# Patient Record
Sex: FEMALE | Race: WHITE
[De-identification: names, ages, dates, MRNs, and addresses within clinical notes are randomized per-mention and may not be internally consistent; named-entity substitution may affect disease eponyms.]

---

## 2017-03-14 ENCOUNTER — HOSPITAL ENCOUNTER (EMERGENCY)
Dept: HOSPITAL 56 - MW.ED | Age: 72
Discharge: HOME | End: 2017-03-14
Payer: MEDICARE

## 2017-03-14 VITALS — SYSTOLIC BLOOD PRESSURE: 141 MMHG | DIASTOLIC BLOOD PRESSURE: 79 MMHG

## 2017-03-14 DIAGNOSIS — Z88.5: ICD-10-CM

## 2017-03-14 DIAGNOSIS — Z90.710: ICD-10-CM

## 2017-03-14 DIAGNOSIS — Z90.49: ICD-10-CM
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
## 2017-03-14 DIAGNOSIS — Z88.0: ICD-10-CM

## 2017-03-14 DIAGNOSIS — Z87.891: ICD-10-CM

## 2017-03-14 DIAGNOSIS — I10: ICD-10-CM

## 2017-03-14 DIAGNOSIS — S80.01XA: Primary | ICD-10-CM

## 2017-03-14 DIAGNOSIS — Z91.012: ICD-10-CM

## 2017-03-14 DIAGNOSIS — Z79.899: ICD-10-CM

## 2017-03-14 NOTE — EDM.PDOC
ED HPI Trauma





- General


Chief Complaint: Lower Extremity Injury/Pain


Stated Complaint: RIGHT KNEE


Time Seen by Provider: 03/14/17 10:39


Source: Reports: Patient


History Limitations: Reports: No limitations





- History of Present Illness


INITIAL COMMENTS - FREE TEXT/NARRATIVE: 


History of present illness:


[] Patient slipped on this no yesterday and landed on her right knee with pain, 

swelling and bruising. She woke up this morning with worsening pain and is 

difficult for her to walk. Her right knee has been replaced and she is 

concerned of potential damage. Patient has mild pain in her left knee and her 

right shoulder but states she has no difficulty with movement and does not want 

these extremities x-ray.





Review of systems: 


As per history of present illness and below otherwise all systems reviewed and 

negative.





Past medical history: 


As per history of present illness and as reviewed below otherwise 

noncontributory.





Surgical history: 


As per history of present illness and as reviewed below otherwise 

noncontributory.





Social history: 


No reported history of drug or alcohol abuse.





Family history: 


As per history of present illness and as reviewed below otherwise 

noncontributory.





Physical exam:


General: Well developed, well nourished in NAD


HEENT: Atraumatic, normocephalic, pupils reactive, negative for conjunctival 

pallor or scleral icterus, mucous membranes moist, throat clear, neck supple, 

nontender, trachea midline.


Lungs: Clear to auscultation, breath sounds equal bilaterally, chest nontender.


Heart: S1S2, regular, negative for clicks, rubs, or JVD.


Abdomen: Soft, nondistended, nontender. Negative for masses or 

hepatosplenomegaly. Negative for costovertebral tenderness.


Pelvis: Stable nontender.


Genitourinary: Deferred.


Rectal: Deferred.


Extremities: Ecchymosis over the right knee with swelling. He is able to bend 

her knee and straighten it spontaneously in bed., negative for cords or calf 

pain. Neurovascular unremarkable.


Neuro: Awake, alert, oriented. Cranial nerves II through XII unremarkable. 

Cerebellum unremarkable. Motor and sensory unremarkable throughout. Exam 

nonfocal.





Diagnostics:


[] X-ray of right knee negative for fracture





Therapeutics:


[] Patient declined any pain medication





Impression: 


[] Right knee contusion





Plan:


[] Ice elevate followup PMD as needed





Definitive disposition and diagnosis as appropriate pending reevaluation and 

review of above.





Allergies/ADRs: 


Allergies





egg Allergy (Mild, Verified 03/14/17 10:47)


 Nausea


codeine Allergy (Verified 03/14/17 10:47)


 Rash


diazepam [From Valium] Allergy (Verified 03/14/17 10:47)


 Rash


morphine Allergy (Verified 03/14/17 10:47)


 Agitation


Penicillins Allergy (Verified 03/14/17 10:47)


 Other








Home Medications: 


Ambulatory Orders





Omeprazole 40 mg PO DAILY 07/28/14 [Confirmed 03/14/17]


Hydrocortisone 10 mg PO QPM 02/08/15 [Confirmed 03/14/17]


Hydrocortisone 15 mg PO QAM 02/08/15 [Confirmed 03/14/17]


Levothyroxine [Synthroid] 50 mcg PO ACBRK #30 tab 02/10/15 [Confirmed 03/14/17]


Propranolol [Inderal] 40 mg PO DAILY 09/30/16 [Confirmed 03/14/17]











Past Medical History


HEENT History: Reports: None


Cardiovascular History: Reports: Hypertension


Other Cardiovascular History: hypotension


Respiratory History: Reports: None


Gastrointestinal History: Reports: None


Genitourinary History: Reports: None


OB/GYN History: Reports: Pregnancy


Musculoskeletal History: Reports: None


Neurological History: Reports: Cerebral aneurysms, Other (see below)


Other Neuro History: brain tumor


Psychiatric History: Reports: None


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


Hematologic History: Reports: None


Immunologic History: Reports: None


Oncologic (Cancer) History: Reports: Other (see below)


Other Oncologic History: brain tumors, treated with radiation


Dermatologic History: Reports: None





- Infectious Disease History


Infectious Disease History: Reports: Chicken pox, Measles, Mumps


Other Infectious Disease History: childhood





- Past Surgical History


Cardiovascular Surgical History: Reports: None


GI Surgical History: Reports: Appendectomy


Female  Surgical History: Reports: Hysterectomy, Other (see below)


Other Female  Surgeries/Procedures: right kidney removed


Endocrine Surgical History: Reports: None


Neurological Surgical History: Reports: None





Social & Family History





- Family History


Family Medical History: Noncontributory





- Tobacco Use


Smoking Status *Q: Former Smoker


Years of Tobacco use: 35


Used Tobacco, but Quit: Yes


Month Tobacco Last Used: January


Second Hand Smoke Exposure: No





- Caffeine Use


Caffeine Use: Reports: None





- Recreational Drug Use


Recreational Drug Use: No





Review of Systems





- Review of Systems


Review Of Systems: See Below (See history of present illness)





Trauma Exam





- Physical Exam


Exam: See Below (History of present illness)





Course





- Vital Signs


Last Recorded V/S: 


 Last Vital Signs











Temp  37.1 C   03/14/17 10:43


 


Pulse  78   03/14/17 10:43


 


Resp  20   03/14/17 10:43


 


BP  133/68   03/14/17 10:43


 


Pulse Ox  96   03/14/17 10:43














- Orders/Labs/Meds


Orders: 


 Active Orders 24 hr











 Category Date Time Status


 


 Knee 3V Rt [CR] Stat Exams  03/14/17 10:51 Taken














Departure





- Departure


Time of Disposition: 11:28


Disposition: Home, Self-Care 01


Condition: good


Clinical Impression: 


Contusion of right knee


Qualifiers:


 Encounter type: initial encounter Qualified Code(s): S80.01XA - Contusion of 

right knee, initial encounter





Referrals: 


Eliot Francois MD [Primary Care Provider] - 


Forms:  ED Department Discharge


Additional Instructions: 


The following information is given to patients seen in the emergency department 

who are being discharged to home. This information is to outline your options 

for follow-up care. We provide all patients seen in our emergency department 

with a follow-up referral.





The need for follow-up, as well as the timing and circumstances, are variable 

depending upon the specifics of your emergency department visit.





If you don't have a primary care physician on staff, we will provide you with a 

referral. We always advise you to contact your personal physician following an 

emergency department visit to inform them of the circumstance of the visit and 

for follow-up with them and/or the need for any referrals to a consulting 

specialist.





The emergency department will also refer you to a specialist when appropriate. 

This referral assures that you have the opportunity for follow-up care with a 

specialist. All of these measure are taken in an effort to provide you with 

optimal care, which includes your follow-up.





Under all circumstances we always encourage you to contact your private 

physician who remains a resource for coordinating your care. When calling for 

follow-up care, please make the office aware that this follow-up is from your 

recent emergency room visit. If for any reason you are refused follow-up, 

please contact the [facility] Emergency 

Department at [phone] and asked to speak to the emergency department 

charge nurse.








Motrin for pain ice and elevate followup her regular physician as needed


[facility]


Primary Care


[address] [place] [id number]


Phone: [phone]


Fax: [phone]








- My Orders


Last 24 Hours: 


My Active Orders





03/14/17 10:51


Knee 3V Rt [CR] Stat 














- Assessment/Plan


Last 24 Hours: 


My Active Orders





03/14/17 10:51


Knee 3V Rt [CR] Stat

## 2017-03-14 NOTE — CR
EXAMINATION: Right knee

 

HISTORY: Pain

 

COMPARISON: None

 

TECHNIQUE: 3 views

 

FINDINGS/IMPRESSION: Right total knee hardware is demonstrated in good position and alignment. No fr
acture, dislocation, or joint effusion identified. Bone mineralization appears osteopenic without ev
idence of loosening.

## 2017-05-04 ENCOUNTER — HOSPITAL ENCOUNTER (OUTPATIENT)
Dept: HOSPITAL 56 - MW.CHORTHO | Age: 72
End: 2017-05-04
Attending: ORTHOPAEDIC SURGERY
Payer: MEDICARE

## 2017-05-04 DIAGNOSIS — M25.762: ICD-10-CM

## 2017-05-04 DIAGNOSIS — M85.862: ICD-10-CM

## 2017-05-04 DIAGNOSIS — M17.12: ICD-10-CM

## 2017-05-04 DIAGNOSIS — M25.562: Primary | ICD-10-CM

## 2017-05-05 NOTE — MOCE
SERVICE DATE:  05/04/2017

PATIENT #:  [id number]

 #:  NOT DICTATED

 

CHIEF COMPLAINT:

Left knee pain.

 

HISTORY OF PRESENT ILLNESS:

Lolly is a 71-year-old female, who is seen today in clinic with complaint of

left knee pain.  She states she has had a gradual increase in her left knee pain

over the past few years.  She cannot recall a specific injury.  She does have a

history of a prior right total knee arthroplasty performed many years ago by Dr. Lizarraga.  She has done well following a right total knee arthroplasty.  She has

had some conservative treatment, including the use of Tylenol and tramadol on an

as needed basis.  She does occasionally use a cane or walker for assistance.

She has not tried any bracing.  She states she will not have any injections into

her left knee due to injections in the right knee did not work.  She complains

of increased pain with activity.  She is also having pain at rest.  She states

that the pain does wake her at night.  She denies distal paralysis or

paresthesias.

 

PHYSICAL EXAMINATION:

GENERAL:  This is an alert, overweight female, who is sitting in wheelchair in

minimal distress.

HEENT:  She does have poor dentition.  Head is normocephalic, atraumatic.

NECK:  Supple.

CHEST:  Showed symmetrical excursions, unlabored.

HEART:  Regular rate.

ABDOMEN:  Benign.

EXTREMITIES:  Left lower extremity shows her to walk with an antalgic gait.  She

is able to climb on the exam table without assistance.  She has no pain with

rotation of her left hip.  Her motion is symmetric to the contralateral side.

She has no significant effusion or malalignment noted along the left knee.  She

has tenderness along the medial joint line.  She has no lateral joint line

tenderness.  She has full extension with flexion to 110 degrees.  She is limited

by her soft tissue envelope.  She is stable to varus and valgus stressing.  She

has a negative anterior drawer.  Crepitus is noted along the patellofemoral

joint.  Anterior tib, EHL, gastroc strength is 5/5.  Sensation is grossly intact

throughout the lower extremity.  Dorsalis pedis pulses 2+.

 

IMAGING DATA:

X-rays of the left knee were reviewed.  This does show evidence of

tricompartmental degenerative changes, which were most severe along the medial

and patellofemoral joint with near bone-on-bone joint findings.

 

ASSESSMENT:

Osteoarthritis, left knee, tricompartmental.

 

PLAN:

At this time, treatment options were discussed with the patient.  She is

presently quite frustrated with her pain and functional level.  She has had a

trial of conservative treatment, which has not been helpful.  She has done well

following her right total knee arthroplasty.  At this time, I discussed the

option of a total knee arthroplasty versus continued conservative treatment.

She would like to hold off on surgical intervention unless it is absolutely

needed.  At this time, I recommended that we try a hinged knee brace to see if

we can decrease some of her subjective feelings of instability.  The patient

does live outside of town.  She states that neither she nor her  are able

to drive.  This would make it somewhat more difficult to perform a total knee

arthroplasty and have her come for physical therapy.  The patient would like to

consider options.  She was not scheduled for a followup appointment.  She is

advised to return to clinic or call if she has questions or concerns.

 

 

Job #:  [id number]

## 2017-07-06 ENCOUNTER — HOSPITAL ENCOUNTER (EMERGENCY)
Dept: HOSPITAL 56 - MW.ED | Age: 72
Discharge: HOME | End: 2017-07-06
Payer: MEDICARE

## 2017-07-06 VITALS — DIASTOLIC BLOOD PRESSURE: 93 MMHG | SYSTOLIC BLOOD PRESSURE: 184 MMHG

## 2017-07-06 DIAGNOSIS — Z88.5: ICD-10-CM

## 2017-07-06 DIAGNOSIS — Z91.012: ICD-10-CM

## 2017-07-06 DIAGNOSIS — Z87.891: ICD-10-CM

## 2017-07-06 DIAGNOSIS — I10: ICD-10-CM

## 2017-07-06 DIAGNOSIS — M54.5: Primary | ICD-10-CM

## 2017-07-06 DIAGNOSIS — Z90.710: ICD-10-CM

## 2017-07-06 DIAGNOSIS — Z88.0: ICD-10-CM

## 2017-07-06 DIAGNOSIS — Z79.899: ICD-10-CM

## 2017-07-06 DIAGNOSIS — E03.9: ICD-10-CM

## 2017-07-06 DIAGNOSIS — Z88.8: ICD-10-CM

## 2017-07-06 PROCEDURE — 96372 THER/PROPH/DIAG INJ SC/IM: CPT

## 2017-07-06 PROCEDURE — 99284 EMERGENCY DEPT VISIT MOD MDM: CPT

## 2017-07-06 NOTE — EDM.PDOC
ED HPI GENERAL MEDICAL PROBLEM





- General


Chief Complaint: Back Pain or Injury


Stated Complaint: AMBULANCE


Time Seen by Provider: 07/06/17 13:18


Source of Information: Reports: Patient


History Limitations: Reports: No Limitations





- History of Present Illness


INITIAL COMMENTS - FREE TEXT/NARRATIVE: 


History of present illness:


[71-year-old female presenting with complaints of back pain. Patient indicates 

she had fallen a proximally month ago was seen by her PCP but now continues to 

have chronic low back pain with radiculopathy]





Review of systems: 


As per history of present illness and below otherwise all systems reviewed and 

negative.





Past medical history: 


As per history of present illness and as reviewed below otherwise 

noncontributory.





Surgical history: 


As per history of present illness and as reviewed below otherwise 

noncontributory.





Social history: 


No reported history of drug or alcohol abuse.





Family history: 


As per history of present illness and as reviewed below otherwise 

noncontributory.





Physical exam:


HEENT: Atraumatic, normocephalic, pupils reactive, negative for conjunctival 

pallor or scleral icterus, mucous membranes moist, throat clear, neck supple, 

nontender, trachea midline.


Lungs: Clear to auscultation, breath sounds equal bilaterally, chest nontender.


Heart: S1S2, regular, negative for clicks, rubs, or JVD.


Abdomen: Soft, nondistended, nontender. Negative for masses or 

hepatosplenomegaly. Negative for costovertebral tenderness.


Pelvis: Stable nontender.


Genitourinary: Deferred.


Rectal: Deferred.


Extremities: Atraumatic, negative for cords or calf pain. Neurovascular 

unremarkable.


Neuro: Awake, alert, oriented. Cranial nerves II through XII unremarkable. 

Cerebellum unremarkable. Motor and sensory unremarkable throughout. Exam 

nonfocal.








Global assessment is benign save the subjective complaint as noted in the 

history of present illness.


Patient received Toradol in ambulance and has one kidney, and refuses opiates 

as she does like to make her feel.





Diagnostics:


[]





Therapeutics:


[Gabapentin, Norflex]





Impression: 


[Low back pain]





Plan:


[Norflex and meloxicam]





Definitive disposition and diagnosis as appropriate pending reevaluation and 

review of above.








  ** Lower Back


Pain Score (Numeric/FACES): 5





- Related Data


 Allergies











Allergy/AdvReac Type Severity Reaction Status Date / Time


 


egg Allergy Mild Nausea Verified 07/06/17 13:10


 


codeine Allergy  Rash Verified 07/06/17 13:10


 


diazepam [From Valium] Allergy  Rash Verified 07/06/17 13:10


 


morphine Allergy  Agitation Verified 07/06/17 13:10


 


Penicillins Allergy  Other Verified 07/06/17 13:10











Home Meds: 


 Home Meds





Omeprazole 40 mg PO DAILY 07/28/14 [History]


Hydrocortisone 10 mg PO QPM 02/08/15 [History]


Hydrocortisone 15 mg PO QAM 02/08/15 [History]


Levothyroxine [Synthroid] 50 mcg PO ACBRK #30 tab 02/10/15 [Rx]


Propranolol [Inderal] 40 mg PO DAILY 09/30/16 [History]


Calcium Carbonate [Calcium] 500 mg PO ASDIRECTED 07/06/17 [History]


Meloxicam 7.5 mg PO BID #30 tablet 07/06/17 [Rx]


Orphenadrine [Norflex] 100 mg PO BID #28 tab.er 07/06/17 [Rx]


Vitamin B Complex 1 each PO DAILY 07/06/17 [History]


traMADol HCl [Tramadol HCl] 1 tab PO ASDIRECTED 07/06/17 [History]











Past Medical History


HEENT History: Reports: None


Cardiovascular History: Reports: Hypertension


Other Cardiovascular History: hypotension


Respiratory History: Reports: None


Gastrointestinal History: Reports: None


Genitourinary History: Reports: None


OB/GYN History: Reports: Pregnancy


Musculoskeletal History: Reports: None


Neurological History: Reports: Cerebral Aneurysms, Other (See Below)


Other Neuro History: brain tumor


Psychiatric History: Reports: None


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


Hematologic History: Reports: None


Immunologic History: Reports: None


Oncologic (Cancer) History: Reports: Other (See Below)


Other Oncologic History: brain tumors, treated with radiation


Dermatologic History: Reports: None





- Infectious Disease History


Infectious Disease History: Reports: Chicken Pox, Measles, Mumps


Other Infectious Disease History: childhood





- Past Surgical History


Female  Surgical History: Reports: Hysterectomy, Other (See Below)





Social & Family History





- Family History


Family Medical History: Noncontributory





- Tobacco Use


Smoking Status *Q: Former Smoker


Years of Tobacco use: 35


Used Tobacco, but Quit: Yes


Month Tobacco Last Used: January


Second Hand Smoke Exposure: No





- Caffeine Use


Caffeine Use: Reports: None





- Recreational Drug Use


Recreational Drug Use: No





ED ROS GENERAL





- Review of Systems


Review Of Systems: See Below (See history of present illness)





ED EXAM, GENERAL





- Physical Exam


Exam: See Below (The history of present illness)





Course





- Vital Signs


Last Recorded V/S: 


 Last Vital Signs











Temp  36.4 C   07/06/17 13:14


 


Pulse  63   07/06/17 14:54


 


Resp  16   07/06/17 14:54


 


BP  179/90 H  07/06/17 14:54


 


Pulse Ox  96   07/06/17 14:54














- Orders/Labs/Meds


Orders: 


 Active Orders 24 hr











 Category Date Time Status


 


 Orphenadrine [Norflex] Med  07/06/17 14:45 Active





 60 mg IM Q12H   








 Medication Orders





Orphenadrine Citrate (Norflex)  60 mg IM Q12H IRVIN


   Last Admin: 07/06/17 14:46  Dose: 60 mg








Meds: 


Medications











Generic Name Dose Route Start Last Admin





  Trade Name Freq  PRN Reason Stop Dose Admin


 


Orphenadrine Citrate  60 mg  07/06/17 14:45  07/06/17 14:46





  Norflex  IM   60 mg





  Q12H IRVIN   Administration














Discontinued Medications














Generic Name Dose Route Start Last Admin





  Trade Name Freq  PRN Reason Stop Dose Admin


 


Gabapentin  300 mg  07/06/17 13:41  07/06/17 14:46





  Neurontin  PO  07/06/17 13:42  300 mg





  ONETIME ONE   Administration














Departure





- Departure


Time of Disposition: 15:30


Disposition: Home, Self-Care 01


Condition: Good


Clinical Impression: 


 Low back pain








- Discharge Information


Prescriptions: 


Meloxicam 7.5 mg PO BID #30 tablet


Orphenadrine [Norflex] 100 mg PO BID #28 tab.er


Additional Instructions: 


The following information is given to patients seen in the emergency department 

who are being discharged to home. This information is to outline your options 

for follow-up care. We provide all patients seen in our emergency department 

with a follow-up referral.





The need for follow-up, as well as the timing and circumstances, are variable 

depending upon the specifics of your emergency department visit.





If you don't have a primary care physician on staff, we will provide you with a 

referral. We always advise you to contact your personal physician following an 

emergency department visit to inform them of the circumstance of the visit and 

for follow-up with them and/or the need for any referrals to a consulting 

specialist.





The emergency department will also refer you to a specialist when appropriate. 

This referral assures that you have the opportunity for follow-up care with a 

specialist. All of these measure are taken in an effort to provide you with 

optimal care, which includes your follow-up.





Under all circumstances we always encourage you to contact your private 

physician who remains a resource for coordinating your care. When calling for 

follow-up care, please make the office aware that this follow-up is from your 

recent emergency room visit. If for any reason you are refused follow-up, 

please contact the [facility] Emergency 

Department at [phone] and asked to speak to the emergency department 

charge nurse.





Take medication as directed





Follow-up with PCP 1-2 days








Return to ED as needed as discussed





- My Orders


Last 24 Hours: 


My Active Orders





07/06/17 14:45


Orphenadrine [Norflex]   60 mg IM Q12H 














- Assessment/Plan


Last 24 Hours: 


My Active Orders





07/06/17 14:45


Orphenadrine [Norflex]   60 mg IM Q12H

## 2017-07-23 ENCOUNTER — HOSPITAL ENCOUNTER (EMERGENCY)
Dept: HOSPITAL 56 - MW.ED | Age: 72
Discharge: HOME | End: 2017-07-23
Payer: MEDICARE

## 2017-07-23 VITALS — DIASTOLIC BLOOD PRESSURE: 77 MMHG | SYSTOLIC BLOOD PRESSURE: 174 MMHG

## 2017-07-23 DIAGNOSIS — Z87.891: ICD-10-CM

## 2017-07-23 DIAGNOSIS — E03.9: ICD-10-CM

## 2017-07-23 DIAGNOSIS — Z90.710: ICD-10-CM

## 2017-07-23 DIAGNOSIS — M25.571: Primary | ICD-10-CM

## 2017-07-23 DIAGNOSIS — I10: ICD-10-CM

## 2017-07-23 DIAGNOSIS — Z88.0: ICD-10-CM

## 2017-07-23 DIAGNOSIS — Z91.012: ICD-10-CM

## 2017-07-23 DIAGNOSIS — Z79.899: ICD-10-CM

## 2017-07-23 DIAGNOSIS — Z88.5: ICD-10-CM

## 2017-07-23 NOTE — EDM.PDOC
ED HPI GENERAL MEDICAL PROBLEM





- General


Chief Complaint: Lower Extremity Injury/Pain


Stated Complaint: PAIN RT ANKLE


Time Seen by Provider: 07/23/17 17:20


Source of Information: Reports: Patient


History Limitations: Reports: No Limitations





- History of Present Illness


INITIAL COMMENTS - FREE TEXT/NARRATIVE: 


HISTORY AND PHYSICAL:





History of present illness:


[Patient comes to the emergency room complaining of right ankle pain. Pain is 

both medial and lateral worse medially. She was using her 's motorized 

wheelchair to perform some tasks around the house. The speed was set too high 

for patient and she continuously ran into objects around her house causing pain 

to her ankle. At 345 she hit some furniture in her home and has been unable to 

bear weight on her right ankle since that time. No lacerations. She denies 

numbness and tingling. She otherwise feels well and has no other complaints or 

concerns. Denies previous trauma. She has not taken any medications for her 

symptoms.





Review of systems: 


As per history of present illness and below otherwise all systems reviewed and 

negative.





Past medical history: 


As per history of present illness and as reviewed below otherwise 

noncontributory.





Surgical history: 


As per history of present illness and as reviewed below otherwise 

noncontributory.





Social history: 


No reported history of drug or alcohol abuse.





Family history: 


As per history of present illness and as reviewed below otherwise 

noncontributory.





Physical exam:


HEENT: Atraumatic, normocephalic.


Extremities: Mild swelling to medial and lateral malleoli. Mild tenderness with 

palpation over the medial malleolus. No acute deformity is appreciated. No 

ecchymosis or erythema. Dorsalis pedal pulse 2+ Neurovascular unremarkable.


Neuro: Awake, alert, oriented.  Motor and sensory unremarkable throughout. Exam 

nonfocal.





Diagnostics:


[Right ankle x-ray]





Impression: 


[Right ankle pain]





Plan:


[Discussed with patient that ankle x-ray shows no fractures. Recommend rest ice 

and Ace wrap and over-the-counter analgesics. Follow-up with primary care. She 

is in agreement with today's plan.]





Definitive disposition and diagnosis as appropriate pending reevaluation and 

review of above.








  ** Right Ankle


Pain Score (Numeric/FACES): 10





- Related Data


 Allergies











Allergy/AdvReac Type Severity Reaction Status Date / Time


 


egg Allergy Mild Nausea Verified 07/23/17 17:11


 


codeine Allergy  Rash Verified 07/23/17 17:11


 


diazepam [From Valium] Allergy  Rash Verified 07/23/17 17:11


 


morphine Allergy  Agitation Verified 07/23/17 17:11


 


Penicillins Allergy  Other Verified 07/23/17 17:11











Home Meds: 


 Home Meds





Omeprazole 40 mg PO DAILY 07/28/14 [History]


Hydrocortisone 10 mg PO QPM 02/08/15 [History]


Hydrocortisone 15 mg PO QAM 02/08/15 [History]


Levothyroxine [Synthroid] 50 mcg PO ACBRK #30 tab 02/10/15 [Rx]


Propranolol [Inderal] 40 mg PO DAILY 09/30/16 [History]


Calcium Carbonate [Calcium] 500 mg PO ASDIRECTED 07/06/17 [History]


Meloxicam 7.5 mg PO BID #30 tablet 07/06/17 [Rx]


Orphenadrine [Norflex] 100 mg PO BID #28 tab.er 07/06/17 [Rx]


Vitamin B Complex 1 each PO DAILY 07/06/17 [History]


traMADol HCl [Tramadol HCl] 1 tab PO ASDIRECTED 07/06/17 [History]











Past Medical History


HEENT History: Reports: Impaired Vision


Cardiovascular History: Reports: Hypertension


Other Cardiovascular History: hypotension


Respiratory History: Reports: None


Gastrointestinal History: Reports: None


Genitourinary History: Reports: None


OB/GYN History: Reports: Pregnancy


Musculoskeletal History: Reports: None


Neurological History: Reports: Cerebral Aneurysms, Other (See Below)


Other Neuro History: brain tumor


Psychiatric History: Reports: None


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


Hematologic History: Reports: None


Immunologic History: Reports: None


Oncologic (Cancer) History: Reports: Other (See Below)


Other Oncologic History: brain tumors, treated with radiation


Dermatologic History: Reports: None





- Infectious Disease History


Infectious Disease History: Reports: Chicken Pox, Measles, Mumps


Other Infectious Disease History: childhood





- Past Surgical History


Female  Surgical History: Reports: Hysterectomy





Social & Family History





- Family History


Family Medical History: Noncontributory





- Tobacco Use


Smoking Status *Q: Former Smoker


Years of Tobacco use: 35


Used Tobacco, but Quit: Yes


Month Tobacco Last Used: Shayan


Second Hand Smoke Exposure: No





- Caffeine Use


Caffeine Use: Reports: None





- Recreational Drug Use


Recreational Drug Use: No





Review of Systems





- Review of Systems


Review Of Systems: ROS reveals no pertinent complaints other than HPI.





ED EXAM, GENERAL





- Physical Exam


Exam: See Below





Course





- Vital Signs


Last Recorded V/S: 


 Last Vital Signs











Temp  98.0 F   07/23/17 17:12


 


Pulse  88   07/23/17 17:12


 


Resp  16   07/23/17 17:12


 


BP  174/77 H  07/23/17 17:12


 


Pulse Ox  95   07/23/17 17:12














- Orders/Labs/Meds


Orders: 


 Active Orders 24 hr











 Category Date Time Status


 


 Ankle Min 3V Rt [CR] Stat Exams  07/23/17 17:22 Taken














Departure





- Departure


Time of Disposition: 17:55


Disposition: Home, Self-Care 01


Condition: Good


Clinical Impression: 


Right ankle pain


Qualifiers:


 Chronicity: acute Qualified Code(s): M25.571 - Pain in right ankle and joints 

of right foot








- Discharge Information


Instructions:  Ankle Pain


Referrals: 


Eliot Francois MD [Primary Care Provider] - 


Forms:  ED Department Discharge


Additional Instructions: 


The following information is given to patients seen in the emergency department 

who are being discharged to home. This information is to outline your options 

for follow-up care. We provide all patients seen in our emergency department 

with a follow-up referral.





The need for follow-up, as well as the timing and circumstances, are variable 

depending upon the specifics of your emergency department visit.





If you don't have a primary care physician on staff, we will provide you with a 

referral. We always advise you to contact your personal physician following an 

emergency department visit to inform them of the circumstance of the visit and 

for follow-up with them and/or the need for any referrals to a consulting 

specialist.





The emergency department will also refer you to a specialist when appropriate. 

This referral assures that you have the opportunity for follow-up care with a 

specialist. All of these measure are taken in an effort to provide you with 

optimal care, which includes your follow-up.





Under all circumstances we always encourage you to contact your private 

physician who remains a resource for coordinating your care. When calling for 

follow-up care, please make the office aware that this follow-up is from your 

recent emergency room visit. If for any reason you are refused follow-up, 

please contact the [facility]  emergency 

department at [phone] and asked to speak to the emergency department 

charge nurse.








[facility]


Primary Care


[address] [place] [address]


Phone: [phone]


Fax: [phone]








Follow-up with your primary care provider at the clinic listed above in 48-72 

hours.


Rest, ice, elevate. May wear an Ace wrap if it provides comfort.


Return to ER as needed as discussed.





- My Orders


Last 24 Hours: 


My Active Orders





07/23/17 17:22


Ankle Min 3V Rt [CR] Stat 














- Assessment/Plan


Last 24 Hours: 


My Active Orders





07/23/17 17:22


Ankle Min 3V Rt [CR] Stat

## 2017-07-24 NOTE — CR
EXAM DATE: 17



PATIENT'S AGE: 71





Patient: KRISHNA TORRE



Facility: [place], [place]

Patient ID: [id number]

Site Patient ID: [id number].

Site Accession #: [id number]

: 1945

Study: XRay Extremity Right Ankle [id number]/23/2017 5:43:42 PM

Ordering Physician: Doctor Temp



Final Report: 

HISTORY:

Pain after twisting injury.



Findings:

Three views of the right ankle are provided. There are no findings for fracture 
or dislocation. The ankle joint space is normally maintained. Spurring changes 
are seen in the posterior calcaneus at the Achilles attachment without change 
compared to 2016.



Impression:

Negative study for fracture.





Dictated by Mir Pringle MD @ 2017 5:44PM

(Electronic Signature)



Report Signed by Proxy.
NIRAV

## 2018-04-02 ENCOUNTER — HOSPITAL ENCOUNTER (OUTPATIENT)
Dept: HOSPITAL 56 - MW.ED | Age: 73
Setting detail: OBSERVATION
LOS: 1 days | Discharge: HOME | End: 2018-04-03
Attending: INTERNAL MEDICINE | Admitting: INTERNAL MEDICINE
Payer: MEDICARE

## 2018-04-02 DIAGNOSIS — Z87.891: ICD-10-CM

## 2018-04-02 DIAGNOSIS — G47.33: ICD-10-CM

## 2018-04-02 DIAGNOSIS — I50.9: ICD-10-CM

## 2018-04-02 DIAGNOSIS — I95.9: ICD-10-CM

## 2018-04-02 DIAGNOSIS — I10: ICD-10-CM

## 2018-04-02 DIAGNOSIS — R09.02: Primary | ICD-10-CM

## 2018-04-02 DIAGNOSIS — N39.0: ICD-10-CM

## 2018-04-02 DIAGNOSIS — Z79.899: ICD-10-CM

## 2018-04-02 DIAGNOSIS — Z91.012: ICD-10-CM

## 2018-04-02 DIAGNOSIS — Z88.8: ICD-10-CM

## 2018-04-02 DIAGNOSIS — E03.9: ICD-10-CM

## 2018-04-02 DIAGNOSIS — Z88.0: ICD-10-CM

## 2018-04-02 LAB
CHLORIDE SERPL-SCNC: 103 MMOL/L (ref 98–107)
SODIUM SERPL-SCNC: 140 MMOL/L (ref 136–145)

## 2018-04-02 PROCEDURE — 96372 THER/PROPH/DIAG INJ SC/IM: CPT

## 2018-04-02 PROCEDURE — 71046 X-RAY EXAM CHEST 2 VIEWS: CPT

## 2018-04-02 PROCEDURE — 96361 HYDRATE IV INFUSION ADD-ON: CPT

## 2018-04-02 PROCEDURE — 80053 COMPREHEN METABOLIC PANEL: CPT

## 2018-04-02 PROCEDURE — 99285 EMERGENCY DEPT VISIT HI MDM: CPT

## 2018-04-02 PROCEDURE — 94640 AIRWAY INHALATION TREATMENT: CPT

## 2018-04-02 PROCEDURE — 87086 URINE CULTURE/COLONY COUNT: CPT

## 2018-04-02 PROCEDURE — 83605 ASSAY OF LACTIC ACID: CPT

## 2018-04-02 PROCEDURE — 36415 COLL VENOUS BLD VENIPUNCTURE: CPT

## 2018-04-02 PROCEDURE — G0378 HOSPITAL OBSERVATION PER HR: HCPCS

## 2018-04-02 PROCEDURE — 85025 COMPLETE CBC W/AUTO DIFF WBC: CPT

## 2018-04-02 PROCEDURE — 81001 URINALYSIS AUTO W/SCOPE: CPT

## 2018-04-02 PROCEDURE — 96374 THER/PROPH/DIAG INJ IV PUSH: CPT

## 2018-04-02 NOTE — PCM.HP
H&P History of Present Illness





- General


Date of Service: 04/02/18


Admit Problem/Dx: 


 Admission Diagnosis/Problem





Admission Diagnosis/Problem      Hypoxia








Source of Information: Patient





- History of Present Illness


Initial Comments - Free Text/Narative: 


This is a 72-year-old female who is being admitted secondary to hypoxia after 

coming into the ER with a 3 day history of cough, shortness of breath in 

particular at night where she states that she wakes up suddenly with episodes 

of not being able to breathe and shortness of breath for the past couple of 

days. Patient was assessed in the ER and was about to be discharged when she 

suddenly became hypoxic with oxygen saturation dropping to 85%, patient was 

given duo nebs and placed on 2 L of oxygen but still continue to have bilateral 

wheezing all throughout the lung fields and there was concerns for possible 

hypoxia requiring inpatient evaluation and support.





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 

which she is taking hydrocortisone, she also has hypothyroidism for which she 

has levothyroxine, he should also has medications for hypertension.





Onset of Symptoms: Reports: Gradual


  ** Abdomen


Pain Score (Numeric/FACES): 7





- Related Data


Allergies/Adverse Reactions: 


 Allergies











Allergy/AdvReac Type Severity Reaction Status Date / Time


 


egg Allergy Mild Nausea Verified 04/02/18 14:59


 


codeine Allergy  Rash Verified 04/02/18 14:59


 


diazepam [From Valium] Allergy  Rash Verified 04/02/18 14:59


 


morphine Allergy  Agitation Verified 04/02/18 14:59


 


Penicillins Allergy  Other Verified 04/02/18 14:59











Home Medications: 


 Home Meds





Omeprazole 40 mg PO DAILY 07/28/14 [History]


Hydrocortisone 10 mg PO QPM 02/08/15 [History]


Hydrocortisone 15 mg PO QAM 02/08/15 [History]


Propranolol [Inderal] 40 mg PO DAILY 09/30/16 [History]


Calcium Carbonate [Calcium] 500 mg PO ASDIRECTED 07/06/17 [History]


Vitamin B Complex 1 each PO DAILY 07/06/17 [History]


traMADol HCl [Tramadol HCl] 1 tab PO ASDIRECTED 07/06/17 [History]


Levothyroxine [Synthroid] 75 mcg PO ACBRK 04/02/18 [History]











Past Medical History


HEENT History: Reports: Impaired Vision


Cardiovascular History: Reports: Hypertension


Other Cardiovascular History: hypotension


Respiratory History: Reports: SOB


Gastrointestinal History: Reports: None


Genitourinary History: Reports: None


OB/GYN History: Reports: Pregnancy


Musculoskeletal History: Reports: None


Neurological History: Reports: Cerebral Aneurysms, CVA, Other (See Below)


Other Neuro History: brain tumor


Psychiatric History: Reports: None


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


Hematologic History: Reports: None


Immunologic History: Reports: None


Oncologic (Cancer) History: Reports: Other (See Below)


Other Oncologic History: brain tumors, treated with radiation


Dermatologic History: Reports: None





- Infectious Disease History


Infectious Disease History: Reports: Chicken Pox, Measles, Mumps


Other Infectious Disease History: childhood





- Past Surgical History


HEENT Surgical History: Reports: Other (See Below)


Other HEENT Surgeries/Procedures: benign brain tumors removed in [id number]


Female  Surgical History: Reports: Hysterectomy, Nephrectomy


Other Female  Surgeries/Procedures: only one kidney





Social & Family History





- Family History


Family Medical History: Noncontributory





- Tobacco Use


Smoking Status *Q: Former Smoker


Years of Tobacco use: 32


Packs/Tins Daily: 0.3


Used Tobacco, but Quit: Yes


Month/Year Tobacco Last Used: 


Second Hand Smoke Exposure: No





- Caffeine Use


Caffeine Use: Reports: Coffee, Tea





- Recreational Drug Use


Recreational Drug Use: No





H&P Review of Systems





- Review of Systems:


Review Of Systems: ROS reveals no pertinent complaints other than HPI.





Exam





- Exam


Exam: See Below





- Vital Signs


Vital Signs: 


 Last Vital Signs











Temp  36.4 C   04/02/18 18:15


 


Pulse  69   04/02/18 18:35


 


Resp  20   04/02/18 18:35


 


BP  119/70   04/02/18 18:35


 


Pulse Ox  97   04/02/18 18:35











Weight: 106 kg





- Exam


Quality Assessment: Supplemental Oxygen


General: Alert, Oriented, Cooperative, Mild Distress


Lungs: Wheezing


Cardiovascular: Regular Rhythm, Tachycardia


GI/Abdominal Exam: Normal Bowel Sounds, Soft, Non-Tender


Extremities: Pedal Edema





- Patient Data


Lab Results Last 24 hrs: 


 Laboratory Results - last 24 hr











  04/02/18 04/02/18 04/02/18 Range/Units





  15:58 15:58 15:58 


 


WBC  8.88    (4.0-11.0)  K/uL


 


RBC  3.79 L    (4.30-5.90)  M/uL


 


Hgb  11.4 L    (12.0-16.0)  g/dL


 


Hct  37.3    (36.0-46.0)  %


 


MCV  98.4 H    (80.0-98.0)  fL


 


MCH  30.1    (27.0-32.0)  pg


 


MCHC  30.6 L    (31.0-37.0)  g/dL


 


RDW Std Deviation  64.6 H    (28.0-62.0)  fl


 


RDW Coeff of Saray  18 H    (11.0-15.0)  %


 


Plt Count  233    (150-400)  K/uL


 


MPV  9.70    (7.40-12.00)  fL


 


Neut % (Auto)  64.8    (48.0-80.0)  %


 


Lymph % (Auto)  23.3    (16.0-40.0)  %


 


Mono % (Auto)  10.9    (0.0-15.0)  %


 


Eos % (Auto)  0.8    (0.0-7.0)  %


 


Baso % (Auto)  0.2    (0.0-1.5)  %


 


Neut # (Auto)  5.8 H    (1.4-5.7)  K/uL


 


Lymph # (Auto)  2.1    (0.6-2.4)  K/uL


 


Mono # (Auto)  1.0 H    (0.0-0.8)  K/uL


 


Eos # (Auto)  0.1    (0.0-0.7)  K/uL


 


Baso # (Auto)  0.0    (0.0-0.1)  K/uL


 


Nucleated RBC %  0.0    /100WBC


 


Nucleated RBCs #  0    K/uL


 


Lactate   0.6   (0.20-2.00)  mmol/L


 


Sodium    140  (136-145)  mmol/L


 


Potassium    4.7  (3.5-5.1)  mmol/L


 


Chloride    103  ()  mmol/L


 


Carbon Dioxide    29.5  (21.0-32.0)  mmol/L


 


BUN    33 H  (7.0-18.0)  mg/dL


 


Creatinine    2.7 H  (0.6-1.0)  mg/dL


 


Est Cr Clr Drug Dosing    15.12  mL/min


 


Estimated GFR (MDRD)    17.2  ml/min


 


Glucose    91  ()  mg/dL


 


Calcium    8.3 L  (8.5-10.1)  mg/dL


 


Total Bilirubin    0.3  (0.2-1.0)  mg/dL


 


AST    33  (15-37)  IU/L


 


ALT    36  (14-63)  IU/L


 


Alkaline Phosphatase    85  ()  U/L


 


Total Protein    6.9  (6.4-8.2)  g/dL


 


Albumin    3.2 L  (3.4-5.0)  g/dL


 


Globulin    3.7 H  (2.0-3.5)  g/dL


 


Albumin/Globulin Ratio    0.9 L  (1.3-2.8)  


 


Urine Color     


 


Urine Appearance     


 


Urine pH     (5.0-8.0)  


 


Ur Specific Gravity     (1.001-1.035)  


 


Urine Protein     (NEGATIVE)  mg/dL


 


Urine Glucose (UA)     (NEGATIVE)  mg/dL


 


Urine Ketones     (NEGATIVE)  mg/dL


 


Urine Occult Blood     (NEGATIVE)  


 


Urine Nitrite     (NEGATIVE)  


 


Urine Bilirubin     (NEGATIVE)  


 


Urine Urobilinogen     (<2.0)  EU/dL


 


Ur Leukocyte Esterase     (NEGATIVE)  


 


Urine RBC     (0-2/HPF)  


 


Urine WBC     (0-5/HPF)  


 


Ur Epithelial Cells     (NONE-FEW)  


 


Urine Bacteria     (NEGATIVE)  


 


Hyaline Casts     (0-2/LPF)  


 


Fine Granular Casts     (NEGATIVE)  


 


Urine Mucus     (NONE-MOD)  














  04/02/18 Range/Units





  18:06 


 


WBC   (4.0-11.0)  K/uL


 


RBC   (4.30-5.90)  M/uL


 


Hgb   (12.0-16.0)  g/dL


 


Hct   (36.0-46.0)  %


 


MCV   (80.0-98.0)  fL


 


MCH   (27.0-32.0)  pg


 


MCHC   (31.0-37.0)  g/dL


 


RDW Std Deviation   (28.0-62.0)  fl


 


RDW Coeff of Saray   (11.0-15.0)  %


 


Plt Count   (150-400)  K/uL


 


MPV   (7.40-12.00)  fL


 


Neut % (Auto)   (48.0-80.0)  %


 


Lymph % (Auto)   (16.0-40.0)  %


 


Mono % (Auto)   (0.0-15.0)  %


 


Eos % (Auto)   (0.0-7.0)  %


 


Baso % (Auto)   (0.0-1.5)  %


 


Neut # (Auto)   (1.4-5.7)  K/uL


 


Lymph # (Auto)   (0.6-2.4)  K/uL


 


Mono # (Auto)   (0.0-0.8)  K/uL


 


Eos # (Auto)   (0.0-0.7)  K/uL


 


Baso # (Auto)   (0.0-0.1)  K/uL


 


Nucleated RBC %   /100WBC


 


Nucleated RBCs #   K/uL


 


Lactate   (0.20-2.00)  mmol/L


 


Sodium   (136-145)  mmol/L


 


Potassium   (3.5-5.1)  mmol/L


 


Chloride   ()  mmol/L


 


Carbon Dioxide   (21.0-32.0)  mmol/L


 


BUN   (7.0-18.0)  mg/dL


 


Creatinine   (0.6-1.0)  mg/dL


 


Est Cr Clr Drug Dosing   mL/min


 


Estimated GFR (MDRD)   ml/min


 


Glucose   ()  mg/dL


 


Calcium   (8.5-10.1)  mg/dL


 


Total Bilirubin   (0.2-1.0)  mg/dL


 


AST   (15-37)  IU/L


 


ALT   (14-63)  IU/L


 


Alkaline Phosphatase   ()  U/L


 


Total Protein   (6.4-8.2)  g/dL


 


Albumin   (3.4-5.0)  g/dL


 


Globulin   (2.0-3.5)  g/dL


 


Albumin/Globulin Ratio   (1.3-2.8)  


 


Urine Color  YELLOW  


 


Urine Appearance  SLT CLOUDY  


 


Urine pH  5.5  (5.0-8.0)  


 


Ur Specific Gravity  1.025  (1.001-1.035)  


 


Urine Protein  30  (NEGATIVE)  mg/dL


 


Urine Glucose (UA)  NEGATIVE  (NEGATIVE)  mg/dL


 


Urine Ketones  NEGATIVE  (NEGATIVE)  mg/dL


 


Urine Occult Blood  NEGATIVE  (NEGATIVE)  


 


Urine Nitrite  NEGATIVE  (NEGATIVE)  


 


Urine Bilirubin  NEGATIVE  (NEGATIVE)  


 


Urine Urobilinogen  0.2  (<2.0)  EU/dL


 


Ur Leukocyte Esterase  TRACE  (NEGATIVE)  


 


Urine RBC  0-2  (0-2/HPF)  


 


Urine WBC  4-7  (0-5/HPF)  


 


Ur Epithelial Cells  FEW  (NONE-FEW)  


 


Urine Bacteria  1+ H  (NEGATIVE)  


 


Hyaline Casts  0-2  (0-2/LPF)  


 


Fine Granular Casts  0-1  (NEGATIVE)  


 


Urine Mucus  MODERATE  (NONE-MOD)  











Result Diagrams: 


 04/02/18 15:58





 04/02/18 15:58


Problem List Initiated/Reviewed/Updated: Yes


Orders Last 24hrs: 


 Active Orders 24 hr











 Category Date Time Status


 


 Patient Status [ADT] Stat ADT  04/02/18 18:48 Active


 


 Height and Weight [RC] UPON Care  04/02/18 19:32 Active


 


 Intake and Output [RC] QSHIFT Care  04/02/18 19:32 Active


 


 Notify Provider Vital Signs [RC] ASDIRECTED Care  04/02/18 19:32 Active


 


 Oxygen Therapy [RC] PRN Care  04/02/18 19:32 Active


 


 Pulse Oximetry [RC] CONTINUOUS Care  04/02/18 19:32 Active


 


 RT Aerosol Therapy [RC] ASDIRECTED Care  04/02/18 18:21 Active


 


 RT Aerosol Therapy [RC] ASDIRECTED Care  04/02/18 19:41 Active


 


 Up With Assistance [RC] ASDIRECTED Care  04/02/18 19:32 Active


 


 VTE/DVT Education [RC] PER UNIT ROUTINE Care  04/02/18 19:32 Active


 


 Vital Signs [RC] Q4H Care  04/02/18 19:32 Active


 


 Regular Diet [DIET] Diet  04/02/18 Breakfast Active


 


 Chest 2V [CR] Stat Exams  04/02/18 16:25 Taken


 


 CBC WITH AUTO DIFF [HEME] AM Lab  04/03/18 05:11 Ordered


 


 COMPREHENSIVE METABOLIC PN,CMP [CHEM] AM Lab  04/03/18 05:11 Ordered


 


 CULTURE URINE [RM] Stat Lab  04/02/18 18:06 Received


 


 UA W/MICROSCOPIC [URIN] Stat Lab  04/02/18 18:06 Ordered


 


 Albuterol/Ipratropium [DuoNeb 3.0-0.5 MG/3 ML] Med  04/02/18 22:00 Active





 3 ml NEB Q4HRRT   


 


 Enoxaparin [Lovenox] Med  04/02/18 19:45 Active





 40 mg SUBCUT Q24H   


 


 Hydrocortisone Med  04/03/18 18:00 Active





 10 mg PO QPM   


 


 Hydrocortisone Med  04/03/18 09:00 Active





 15 mg PO QAM   


 


 Lactated Ringers [Ringers, Lactated] 1,000 ml Med  04/02/18 18:15 Active





 IV .BOLUS   


 


 Lactated Ringers [Ringers, Lactated] 1,000 ml Med  04/02/18 19:45 Active





 IV ASDIRECTED   


 


 Levothyroxine [Synthroid] Med  04/03/18 07:30 Active





 75 mcg PO ACBRK   


 


 Sodium Chloride 0.9% [Saline Flush] Med  04/02/18 19:32 Active





 10 ml FLUSH ASDIRECTED PRN   


 


 Sodium Chloride 0.9% [Saline Flush] Med  04/02/18 19:32 Active





 2.5 ml FLUSH ASDIRECTED PRN   


 


 cefTRIAXone [Rocephin] 1,000 mg Med  04/02/18 19:50 Active





 Sodium Chloride 0.9% [Normal Saline] 50 ml   





 IV Q24H   


 


 Peripheral IV Insertion Adult [OM.PC] Routine Oth  04/02/18 19:32 Ordered


 


 Saline Lock Insert [OM.PC] Routine Oth  04/02/18 19:32 Ordered


 


 Sequential Compression Device [OM.PC] Per Unit Routine Oth  04/02/18 19:32 

Ordered








 Medication Orders





Albuterol/Ipratropium (Duoneb 3.0-0.5 Mg/3 Ml)  3 ml NEB Q4HRRT IRVIN


Enoxaparin Sodium (Lovenox)  40 mg SUBCUT Q24H IRVIN


Lactated Ringer's (Ringers, Lactated)  1,000 mls @ 999 mls/hr IV .BOLUS IRVIN


   Last Admin: 04/02/18 18:12  Dose: 999 mls/hr


Ceftriaxone Sodium 1,000 mg/ (Sodium Chloride)  50 mls @ 200 mls/hr IV Q24H ONE


   Stop: 04/02/18 20:04


Lactated Ringer's (Ringers, Lactated)  1,000 mls @ 125 mls/hr IV ASDIRECTED IRVIN


Levothyroxine Sodium (Synthroid)  75 mcg PO ACBRK IRVIN


Non-Formulary Medication (Hydrocortisone)  10 mg PO QPM IRVIN


Non-Formulary Medication (Hydrocortisone)  15 mg PO QAM IRVIN


Sodium Chloride (Saline Flush)  10 ml FLUSH ASDIRECTED PRN


   PRN Reason: Keep Vein Open


Sodium Chloride (Saline Flush)  2.5 ml FLUSH ASDIRECTED PRN


   PRN Reason: Keep Vein Open








Assessment/Plan Comment:: 


This is a 72-year-old female that is being admitted secondary to wheezing, 

shortness of breath, hypoxia despite DuoNeb therapy and O2 intervention. 

Patient does not have a previous medical history of respiratory dysfunction 

however she does appear to have signs and symptoms of possible JEFFERSON. Patient is 

mildly obese and does state that she has episodes of waking up at night without 

the ability to breathe. Patient also has a significant medical history of 

Guille's disease, hypothyroidism, hypertension.





Assessment/plan


For the patient's respiratory symptoms I shall be placing the patient on DuoNeb 

therapy and continuing with the patient's hydrocortisone oral dose that she is 

taking for Vassar's disease as that should also cover adequately from a 

steroid standpoint. Shall monitor the patient and reassess in the a.m. to see 

if the respiratory status improves.





Patient didn't appear to have a mildly dirty UA as such the patient has been 

given 1 dose of Rocephin and a urine culture has been placed and we shall 

reassess in the morning.


Patient is admitted under LESS than 2 midnights.

## 2018-04-02 NOTE — EDM.PDOC
ED HPI GENERAL MEDICAL PROBLEM





- General


Chief Complaint: ENT Problem


Stated Complaint: SORE THROAT,COUGH


Time Seen by Provider: 04/02/18 16:15


Source of Information: Reports: Patient


History Limitations: Reports: No Limitations





- History of Present Illness


INITIAL COMMENTS - FREE TEXT/NARRATIVE: 





HISTORY AND PHYSICAL:





History of present illness:


[Comes to the emergency room accompanied by a family member. She complains of 

sore throat, cough and loose stools for the past 3 days. Her symptoms are 

gradually worsening. She feels increased fatigue today, and has felt feverish 

alternating with chills. Has a lot of wheezing and hacking cough. Cough is 

nonproductive. History of Guille's disease, and acute renal failure. She 

follows regularly with Dr. Eliot Francois.


Denies chest pain and difficulty breathing. No overt shortness of breath. 

Appetite is been diminished for 3 days. No vomiting or nausea. Stools have been 

looser than usual for 3 days but no blood in stools. Denies abdominal pain. No 

change to urination. She has not taken any medications for her symptoms.]





Review of systems: 


As per history of present illness and below otherwise all systems reviewed and 

negative.





Past medical history: 


As per history of present illness and as reviewed below otherwise 

noncontributory.





Surgical history: 


As per history of present illness and as reviewed below otherwise 

noncontributory.





Social history: 


No reported history of drug or alcohol abuse.





Family history: 


As per history of present illness and as reviewed below otherwise 

noncontributory.





Physical exam:


Gen.: Well-developed well-nourished  female laying comfortably on exam 

table sleeping soundly.


HEENT: Atraumatic, normocephalic. Wears glasses. Oral mucous membranes are pink 

and moist. No tonsillar swelling erythema or exudate. Neck supple no 

lymphadenopathy.


Lungs: Wheezing is appreciated throughout all lung fields..


Heart: S1S2, regular, negative for clicks, rubs, or JVD.


Abdomen: Soft, nondistended, nontender. Negative for masses or 

hepatosplenomegaly. Negative for costovertebral tenderness.


Pelvis: Stable nontender.


Genitourinary: Deferred.


Rectal: Deferred.


Extremities: Atraumatic, negative for cords or calf pain. Neurovascular 

unremarkable.


Neuro: Awake, alert, oriented. Cranial nerves II through XII unremarkable. 

Cerebellum unremarkable. Motor and sensory unremarkable throughout. Exam 

nonfocal.





Diagnostics:


[CBC, CMP, UA, lactate, CXR]





Therapeutics:


[LR x 1 liter, DuoNeb]





Impression: 


[Hypoxia


Wheezing]





Plan:


[CXR is clear. CBC shows Hgb of 11.4, which is stable for patient. WBC 8.88. 

Lactate 0.6. BUN 33, creatinine 2.7. Kidney function is stable. Calcium 8.3. 

While she is in the ER, her wheezing increases and she becomes hypoxic with O2 

sat of 88% on room air. Sat increases to 95% on 2L of O2.  She feels thirsty 

and dehydrated. LR 1000mL is started at 250mL/hour. Patient's condition is 

reviewed w/ Dr. Lilly who agrees to accept patient for observation. ]





Definitive disposition and diagnosis as appropriate pending reevaluation and 

review of above.





  ** Abdomen


Pain Score (Numeric/FACES): 7





- Related Data


 Allergies











Allergy/AdvReac Type Severity Reaction Status Date / Time


 


egg Allergy Mild Nausea Verified 04/02/18 14:59


 


codeine Allergy  Rash Verified 04/02/18 14:59


 


diazepam [From Valium] Allergy  Rash Verified 04/02/18 14:59


 


morphine Allergy  Agitation Verified 04/02/18 14:59


 


Penicillins Allergy  Other Verified 04/02/18 14:59











Home Meds: 


 Home Meds





Omeprazole 40 mg PO DAILY 07/28/14 [History]


Hydrocortisone 10 mg PO QPM 02/08/15 [History]


Hydrocortisone 15 mg PO QAM 02/08/15 [History]


Propranolol [Inderal] 40 mg PO DAILY 09/30/16 [History]


Calcium Carbonate [Calcium] 500 mg PO ASDIRECTED 07/06/17 [History]


Vitamin B Complex 1 each PO DAILY 07/06/17 [History]


traMADol HCl [Tramadol HCl] 1 tab PO ASDIRECTED 07/06/17 [History]


Levothyroxine [Synthroid] 75 mcg PO ACBRK 04/02/18 [History]











Past Medical History


HEENT History: Reports: Impaired Vision


Cardiovascular History: Reports: Hypertension


Other Cardiovascular History: hypotension


Respiratory History: Reports: SOB


Gastrointestinal History: Reports: None


Genitourinary History: Reports: None


OB/GYN History: Reports: Pregnancy


Musculoskeletal History: Reports: None


Neurological History: Reports: Cerebral Aneurysms, CVA, Other (See Below)


Other Neuro History: brain tumor


Psychiatric History: Reports: None


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


Hematologic History: Reports: None


Immunologic History: Reports: None


Oncologic (Cancer) History: Reports: Other (See Below)


Other Oncologic History: brain tumors, treated with radiation


Dermatologic History: Reports: None





- Infectious Disease History


Infectious Disease History: Reports: Chicken Pox, Measles, Mumps


Other Infectious Disease History: childhood





- Past Surgical History


HEENT Surgical History: Reports: Other (See Below)


Other HEENT Surgeries/Procedures: benign brain tumors removed in [id number]


Female  Surgical History: Reports: Hysterectomy, Nephrectomy


Other Female  Surgeries/Procedures: only one kidney





Social & Family History





- Family History


Family Medical History: Noncontributory





- Tobacco Use


Smoking Status *Q: Former Smoker


Years of Tobacco use: 32


Packs/Tins Daily: 0.3


Used Tobacco, but Quit: Yes


Month/Year Tobacco Last Used: 


Second Hand Smoke Exposure: No





- Caffeine Use


Caffeine Use: Reports: Coffee, Tea





- Recreational Drug Use


Recreational Drug Use: No





ED ROS ENT





- Review of Systems


Review Of Systems: ROS reveals no pertinent complaints other than HPI.





ED EXAM, ENT





- Physical Exam


Exam: See Below





Course





- Vital Signs


Last Recorded V/S: 


 Last Vital Signs











Temp  97.6 F   04/02/18 18:15


 


Pulse  69   04/02/18 18:35


 


Resp  20   04/02/18 18:35


 


BP  119/70   04/02/18 18:35


 


Pulse Ox  97   04/02/18 18:35














- Orders/Labs/Meds


Orders: 


 Active Orders 24 hr











 Category Date Time Status


 


 Patient Status [ADT] Stat ADT  04/02/18 18:48 Active


 


 RT Aerosol Therapy [RC] ASDIRECTED Care  04/02/18 18:21 Active


 


 Chest 2V [CR] Stat Exams  04/02/18 16:25 Taken


 


 UA W/MICROSCOPIC [URIN] Stat Lab  04/02/18 18:06 Ordered


 


 Lactated Ringers [Ringers, Lactated] 1,000 ml Med  04/02/18 18:15 Active





 IV .BOLUS   








 Medication Orders





Lactated Ringer's (Ringers, Lactated)  1,000 mls @ 999 mls/hr IV .BOLUS IRVIN


   Last Admin: 04/02/18 18:12  Dose: 999 mls/hr








Labs: 


 Laboratory Tests











  04/02/18 04/02/18 04/02/18 Range/Units





  15:58 15:58 15:58 


 


WBC  8.88    (4.0-11.0)  K/uL


 


RBC  3.79 L    (4.30-5.90)  M/uL


 


Hgb  11.4 L    (12.0-16.0)  g/dL


 


Hct  37.3    (36.0-46.0)  %


 


MCV  98.4 H    (80.0-98.0)  fL


 


MCH  30.1    (27.0-32.0)  pg


 


MCHC  30.6 L    (31.0-37.0)  g/dL


 


RDW Std Deviation  64.6 H    (28.0-62.0)  fl


 


RDW Coeff of Saray  18 H    (11.0-15.0)  %


 


Plt Count  233    (150-400)  K/uL


 


MPV  9.70    (7.40-12.00)  fL


 


Neut % (Auto)  64.8    (48.0-80.0)  %


 


Lymph % (Auto)  23.3    (16.0-40.0)  %


 


Mono % (Auto)  10.9    (0.0-15.0)  %


 


Eos % (Auto)  0.8    (0.0-7.0)  %


 


Baso % (Auto)  0.2    (0.0-1.5)  %


 


Neut # (Auto)  5.8 H    (1.4-5.7)  K/uL


 


Lymph # (Auto)  2.1    (0.6-2.4)  K/uL


 


Mono # (Auto)  1.0 H    (0.0-0.8)  K/uL


 


Eos # (Auto)  0.1    (0.0-0.7)  K/uL


 


Baso # (Auto)  0.0    (0.0-0.1)  K/uL


 


Nucleated RBC %  0.0    /100WBC


 


Nucleated RBCs #  0    K/uL


 


Lactate   0.6   (0.20-2.00)  mmol/L


 


Sodium    140  (136-145)  mmol/L


 


Potassium    4.7  (3.5-5.1)  mmol/L


 


Chloride    103  ()  mmol/L


 


Carbon Dioxide    29.5  (21.0-32.0)  mmol/L


 


BUN    33 H  (7.0-18.0)  mg/dL


 


Creatinine    2.7 H  (0.6-1.0)  mg/dL


 


Est Cr Clr Drug Dosing    15.12  mL/min


 


Estimated GFR (MDRD)    17.2  ml/min


 


Glucose    91  ()  mg/dL


 


Calcium    8.3 L  (8.5-10.1)  mg/dL


 


Total Bilirubin    0.3  (0.2-1.0)  mg/dL


 


AST    33  (15-37)  IU/L


 


ALT    36  (14-63)  IU/L


 


Alkaline Phosphatase    85  ()  U/L


 


Total Protein    6.9  (6.4-8.2)  g/dL


 


Albumin    3.2 L  (3.4-5.0)  g/dL


 


Globulin    3.7 H  (2.0-3.5)  g/dL


 


Albumin/Globulin Ratio    0.9 L  (1.3-2.8)  


 


Urine Color     


 


Urine Appearance     


 


Urine pH     (5.0-8.0)  


 


Ur Specific Gravity     (1.001-1.035)  


 


Urine Protein     (NEGATIVE)  mg/dL


 


Urine Glucose (UA)     (NEGATIVE)  mg/dL


 


Urine Ketones     (NEGATIVE)  mg/dL


 


Urine Occult Blood     (NEGATIVE)  


 


Urine Nitrite     (NEGATIVE)  


 


Urine Bilirubin     (NEGATIVE)  


 


Urine Urobilinogen     (<2.0)  EU/dL


 


Ur Leukocyte Esterase     (NEGATIVE)  


 


Urine RBC     (0-2/HPF)  


 


Urine WBC     (0-5/HPF)  


 


Ur Epithelial Cells     (NONE-FEW)  


 


Urine Bacteria     (NEGATIVE)  


 


Hyaline Casts     (0-2/LPF)  


 


Fine Granular Casts     (NEGATIVE)  


 


Urine Mucus     (NONE-MOD)  














  04/02/18 Range/Units





  18:06 


 


WBC   (4.0-11.0)  K/uL


 


RBC   (4.30-5.90)  M/uL


 


Hgb   (12.0-16.0)  g/dL


 


Hct   (36.0-46.0)  %


 


MCV   (80.0-98.0)  fL


 


MCH   (27.0-32.0)  pg


 


MCHC   (31.0-37.0)  g/dL


 


RDW Std Deviation   (28.0-62.0)  fl


 


RDW Coeff of Saray   (11.0-15.0)  %


 


Plt Count   (150-400)  K/uL


 


MPV   (7.40-12.00)  fL


 


Neut % (Auto)   (48.0-80.0)  %


 


Lymph % (Auto)   (16.0-40.0)  %


 


Mono % (Auto)   (0.0-15.0)  %


 


Eos % (Auto)   (0.0-7.0)  %


 


Baso % (Auto)   (0.0-1.5)  %


 


Neut # (Auto)   (1.4-5.7)  K/uL


 


Lymph # (Auto)   (0.6-2.4)  K/uL


 


Mono # (Auto)   (0.0-0.8)  K/uL


 


Eos # (Auto)   (0.0-0.7)  K/uL


 


Baso # (Auto)   (0.0-0.1)  K/uL


 


Nucleated RBC %   /100WBC


 


Nucleated RBCs #   K/uL


 


Lactate   (0.20-2.00)  mmol/L


 


Sodium   (136-145)  mmol/L


 


Potassium   (3.5-5.1)  mmol/L


 


Chloride   ()  mmol/L


 


Carbon Dioxide   (21.0-32.0)  mmol/L


 


BUN   (7.0-18.0)  mg/dL


 


Creatinine   (0.6-1.0)  mg/dL


 


Est Cr Clr Drug Dosing   mL/min


 


Estimated GFR (MDRD)   ml/min


 


Glucose   ()  mg/dL


 


Calcium   (8.5-10.1)  mg/dL


 


Total Bilirubin   (0.2-1.0)  mg/dL


 


AST   (15-37)  IU/L


 


ALT   (14-63)  IU/L


 


Alkaline Phosphatase   ()  U/L


 


Total Protein   (6.4-8.2)  g/dL


 


Albumin   (3.4-5.0)  g/dL


 


Globulin   (2.0-3.5)  g/dL


 


Albumin/Globulin Ratio   (1.3-2.8)  


 


Urine Color  YELLOW  


 


Urine Appearance  SLT CLOUDY  


 


Urine pH  5.5  (5.0-8.0)  


 


Ur Specific Gravity  1.025  (1.001-1.035)  


 


Urine Protein  30  (NEGATIVE)  mg/dL


 


Urine Glucose (UA)  NEGATIVE  (NEGATIVE)  mg/dL


 


Urine Ketones  NEGATIVE  (NEGATIVE)  mg/dL


 


Urine Occult Blood  NEGATIVE  (NEGATIVE)  


 


Urine Nitrite  NEGATIVE  (NEGATIVE)  


 


Urine Bilirubin  NEGATIVE  (NEGATIVE)  


 


Urine Urobilinogen  0.2  (<2.0)  EU/dL


 


Ur Leukocyte Esterase  TRACE  (NEGATIVE)  


 


Urine RBC  0-2  (0-2/HPF)  


 


Urine WBC  4-7  (0-5/HPF)  


 


Ur Epithelial Cells  FEW  (NONE-FEW)  


 


Urine Bacteria  1+ H  (NEGATIVE)  


 


Hyaline Casts  0-2  (0-2/LPF)  


 


Fine Granular Casts  0-1  (NEGATIVE)  


 


Urine Mucus  MODERATE  (NONE-MOD)  











Meds: 


Medications











Generic Name Dose Route Start Last Admin





  Trade Name Sissy  PRN Reason Stop Dose Admin


 


Lactated Ringer's  1,000 mls @ 999 mls/hr  04/02/18 18:15  04/02/18 18:12





  Ringers, Lactated  IV   999 mls/hr





  .BOLUS IRVIN   Administration





     





     





     





     














Discontinued Medications














Generic Name Dose Route Start Last Admin





  Trade Name Sissy  PRN Reason Stop Dose Admin


 


Albuterol/Ipratropium  3 ml  04/02/18 18:21  04/02/18 18:28





  Duoneb 3.0-0.5 Mg/3 Ml  NEB  04/02/18 18:22  3 ml





  ONETIME ONE   Administration





     





     





     





     














Departure





- Departure


Time of Disposition: 18:48


Disposition: Refer to Observation


Condition: Good


Clinical Impression: 


 Hypoxia








- Discharge Information


Referrals: 


PCP,None [Primary Care Provider] - 


Forms:  ED Department Discharge





- My Orders


Last 24 Hours: 


My Active Orders





04/02/18 16:25


Chest 2V [CR] Stat 





04/02/18 18:06


UA W/MICROSCOPIC [URIN] Stat 





04/02/18 18:15


Lactated Ringers [Ringers, Lactated] 1,000 ml IV .BOLUS 





04/02/18 18:21


RT Aerosol Therapy [RC] ASDIRECTED 





04/02/18 18:48


Patient Status [ADT] Stat 














- Assessment/Plan


Last 24 Hours: 


My Active Orders





04/02/18 16:25


Chest 2V [CR] Stat 





04/02/18 18:06


UA W/MICROSCOPIC [URIN] Stat 





04/02/18 18:15


Lactated Ringers [Ringers, Lactated] 1,000 ml IV .BOLUS 





04/02/18 18:21


RT Aerosol Therapy [RC] ASDIRECTED 





04/02/18 18:48


Patient Status [ADT] Stat

## 2018-04-03 VITALS — DIASTOLIC BLOOD PRESSURE: 67 MMHG | SYSTOLIC BLOOD PRESSURE: 119 MMHG

## 2018-04-03 LAB
CHLORIDE SERPL-SCNC: 104 MMOL/L (ref 98–107)
SODIUM SERPL-SCNC: 139 MMOL/L (ref 136–145)

## 2018-04-03 NOTE — [id number]
**Discharge Summary





- Hospital Course


HPI Initial Comments: 


Discharge Summary


Date of admission: 4/2/2018


Date of discharge: 4/3/2018





Admitting diagnosis:


#1. Wheezing, oxygen desaturation to 84% on room air secondary to Acute hypoxia 

requiring oxygen support and medical intervention


#2. Urinary analysis indicating possible UTI, urine culture pending, Rocephin 

for UTI


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


#4. 


#5. 





Discharge diagnoses:


#1. Hypoxia requiring home O2, wheezing/shortness of breath now improved, no 

infectious etiology seen on chest x-ray or based on physical examination and 

history.


#2. Possible UTI urine cultures pending, Rocephin for UTI


#3. Past medical history for Cumberland's disease, assessment required for 

obstructive sleep apnea, congestive heart failure, possible underlying lung 

etiology


#4.


#5. 





Consultations: None





Procedures: None





Hospitalization course: Patient was admitted to the general floor under 

observation secondary to wheezing and hypoxia with oxygen saturation dropping 

down to 84% on room air. Patient was placed on 2 L of oxygen, given DuoNeb 

scheduled overnight. Patient was stabilized, wheezing had decreased by the time 

assessment was done in the morning, patient's airway was clear however patient 

was still requiring oxygen as titration could not be weaned off due to O2 

saturation dropping below 88%. Patient however did feel a lot better and stated 

that she would like to go home.





Patient also was noted to have a urinalysis that did indicate possible UTI 

infection patient has been placed on Rocephin for the possible UTI urine 

culture is pending.








An oxygen challenge was done prior to discharge in which while the patient was 

lying down on room air patient's oxygen saturation dropped to 88%. No further 

assessment is required as while lying down on room air patient is below 88%. 

When placed on 2 L of oxygen while lying down patient's oxygen saturation was 

up to 92%.


Patient is to be discharged on home oxygen of 2 L to be worn at all times for 

the next 3 months or up until reassessment by primary care physician.








Disposition on discharge: Home


Condition on discharge: Stable


Discharge medications: Continuation of home medication, albuterol inhaler when 

necessary for shortness of breath and wheezing, Advair 250/50 inhaler 1 puff 2 

times a day scheduled, home oxygen 2 L to be worn at all times for the next 3 

months or up until reassessment by primary care physician. Keflex for 5 days.


Follow-up instructions: Follow-up with Dr. Eliot Francois this Thursday.








- Discharge Data


Discharge Date: 04/03/18


Discharge Disposition: Home, Self-Care 01


Condition: Fair





- Discharge Diagnosis/Problem(s)


(1) Hypoxia


SNOMED Code(s): 769583373


   ICD Code: R09.02 - HYPOXEMIA   Status: Acute   Current Visit: Yes   





(2) UTI (urinary tract infection)


SNOMED Code(s): 86346224


   ICD Code: N39.0 - URINARY TRACT INFECTION, SITE NOT SPECIFIED   Status: 

Acute   Current Visit: No   


Qualifiers: 


   Urinary tract infection type: site unspecified   Hematuria presence: without 

hematuria   Qualified Code(s): N39.0 - Urinary tract infection, site not 

specified   





- Patient Instructions


Diet: Usual Diet as Tolerated


Activity: As Tolerated


Driving: Do Not Drive


Showering/Bathing: May Shower


Notify Provider of: Fever, Increased Pain, Swelling and Redness, Drainage, 

Nausea and/or Vomiting





- Discharge Plan


Prescriptions/Med Rec: 


Albuterol [IJD: Albuterol HFA] 1 puff .XX Q4H PRN 30 Days #8 gm


 PRN Reason: Shortness Of Breath


Cephalexin [Keflex] 500 mg PO Q8H 5 Days #15 cap


Fluticasone/Salmeterol [Advair 250-50] 1 puff INH BID 30 Days #1 diskus


Home Medications: 


 Home Meds





Omeprazole 40 mg PO DAILY 07/28/14 [History]


Hydrocortisone 10 mg PO QPM 02/08/15 [History]


Hydrocortisone 15 mg PO QAM 02/08/15 [History]


Propranolol [Inderal] 40 mg PO DAILY 09/30/16 [History]


Calcium Carbonate [Calcium] 500 mg PO ASDIRECTED 07/06/17 [History]


Vitamin B Complex 1 each PO DAILY 07/06/17 [History]


traMADol HCl [Tramadol HCl] 1 tab PO ASDIRECTED 07/06/17 [History]


Levothyroxine [Synthroid] 75 mcg PO ACBRK 04/02/18 [History]


Albuterol [IJD: Albuterol HFA] 1 puff .XX Q4H PRN 30 Days #8 gm 04/03/18 [Rx]


Cephalexin [Keflex] 500 mg PO Q8H 5 Days #15 cap 04/03/18 [Rx]


Fluticasone/Salmeterol [Advair 250-50] 1 puff INH BID 30 Days #1 diskus 04/03/ 18 [Rx]








Referrals: 


[facility] [Outside]


Eliot Francois MD [Physician] - 04/13/18 9:00 am





- Discharge Summary/Plan Comment


DC Time >30 min.: No





- Patient Data


Vitals - Most Recent: 


 Last Vital Signs











Temp  36.6 C   04/03/18 12:00


 


Pulse  68   04/03/18 12:00


 


Resp  20   04/03/18 12:00


 


BP  119/67   04/03/18 12:00


 


Pulse Ox  91 L  04/03/18 12:00











Weight - Most Recent: 106 kg


I&O - Last 24 hours: 


 Intake & Output











 04/02/18 04/03/18 04/03/18





 22:59 06:59 14:59


 


Intake Total  1390 


 


Output Total  950 


 


Balance  440 











Lab Results - Last 24 hrs: 


 Laboratory Results - last 24 hr











  04/02/18 04/02/18 04/02/18 Range/Units





  15:58 15:58 15:58 


 


WBC  8.88    (4.0-11.0)  K/uL


 


RBC  3.79 L    (4.30-5.90)  M/uL


 


Hgb  11.4 L    (12.0-16.0)  g/dL


 


Hct  37.3    (36.0-46.0)  %


 


MCV  98.4 H    (80.0-98.0)  fL


 


MCH  30.1    (27.0-32.0)  pg


 


MCHC  30.6 L    (31.0-37.0)  g/dL


 


RDW Std Deviation  64.6 H    (28.0-62.0)  fl


 


RDW Coeff of Saray  18 H    (11.0-15.0)  %


 


Plt Count  233    (150-400)  K/uL


 


MPV  9.70    (7.40-12.00)  fL


 


Neut % (Auto)  64.8    (48.0-80.0)  %


 


Lymph % (Auto)  23.3    (16.0-40.0)  %


 


Mono % (Auto)  10.9    (0.0-15.0)  %


 


Eos % (Auto)  0.8    (0.0-7.0)  %


 


Baso % (Auto)  0.2    (0.0-1.5)  %


 


Neut # (Auto)  5.8 H    (1.4-5.7)  K/uL


 


Lymph # (Auto)  2.1    (0.6-2.4)  K/uL


 


Mono # (Auto)  1.0 H    (0.0-0.8)  K/uL


 


Eos # (Auto)  0.1    (0.0-0.7)  K/uL


 


Baso # (Auto)  0.0    (0.0-0.1)  K/uL


 


Nucleated RBC %  0.0    /100WBC


 


Nucleated RBCs #  0    K/uL


 


Lactate   0.6   (0.20-2.00)  mmol/L


 


Sodium    140  (136-145)  mmol/L


 


Potassium    4.7  (3.5-5.1)  mmol/L


 


Chloride    103  ()  mmol/L


 


Carbon Dioxide    29.5  (21.0-32.0)  mmol/L


 


BUN    33 H  (7.0-18.0)  mg/dL


 


Creatinine    2.7 H  (0.6-1.0)  mg/dL


 


Est Cr Clr Drug Dosing    15.12  mL/min


 


Estimated GFR (MDRD)    17.2  ml/min


 


Glucose    91  ()  mg/dL


 


Calcium    8.3 L  (8.5-10.1)  mg/dL


 


Total Bilirubin    0.3  (0.2-1.0)  mg/dL


 


AST    33  (15-37)  IU/L


 


ALT    36  (14-63)  IU/L


 


Alkaline Phosphatase    85  ()  U/L


 


Total Protein    6.9  (6.4-8.2)  g/dL


 


Albumin    3.2 L  (3.4-5.0)  g/dL


 


Globulin    3.7 H  (2.0-3.5)  g/dL


 


Albumin/Globulin Ratio    0.9 L  (1.3-2.8)  


 


Urine Color     


 


Urine Appearance     


 


Urine pH     (5.0-8.0)  


 


Ur Specific Gravity     (1.001-1.035)  


 


Urine Protein     (NEGATIVE)  mg/dL


 


Urine Glucose (UA)     (NEGATIVE)  mg/dL


 


Urine Ketones     (NEGATIVE)  mg/dL


 


Urine Occult Blood     (NEGATIVE)  


 


Urine Nitrite     (NEGATIVE)  


 


Urine Bilirubin     (NEGATIVE)  


 


Urine Urobilinogen     (<2.0)  EU/dL


 


Ur Leukocyte Esterase     (NEGATIVE)  


 


Urine RBC     (0-2/HPF)  


 


Urine WBC     (0-5/HPF)  


 


Ur Epithelial Cells     (NONE-FEW)  


 


Urine Bacteria     (NEGATIVE)  


 


Hyaline Casts     (0-2/LPF)  


 


Fine Granular Casts     (NEGATIVE)  


 


Urine Mucus     (NONE-MOD)  














  04/02/18 04/03/18 04/03/18 Range/Units





  18:06 05:18 05:18 


 


WBC   7.77   (4.0-11.0)  K/uL


 


RBC   3.46 L   (4.30-5.90)  M/uL


 


Hgb   10.4 L   (12.0-16.0)  g/dL


 


Hct   34.5 L   (36.0-46.0)  %


 


MCV   99.7 H   (80.0-98.0)  fL


 


MCH   30.1   (27.0-32.0)  pg


 


MCHC   30.1 L   (31.0-37.0)  g/dL


 


RDW Std Deviation   64.9 H   (28.0-62.0)  fl


 


RDW Coeff of Saray   18 H   (11.0-15.0)  %


 


Plt Count   220   (150-400)  K/uL


 


MPV   9.40   (7.40-12.00)  fL


 


Neut % (Auto)   66.1   (48.0-80.0)  %


 


Lymph % (Auto)   22.9   (16.0-40.0)  %


 


Mono % (Auto)   9.3   (0.0-15.0)  %


 


Eos % (Auto)   1.3   (0.0-7.0)  %


 


Baso % (Auto)   0.4   (0.0-1.5)  %


 


Neut # (Auto)   5.1   (1.4-5.7)  K/uL


 


Lymph # (Auto)   1.8   (0.6-2.4)  K/uL


 


Mono # (Auto)   0.7   (0.0-0.8)  K/uL


 


Eos # (Auto)   0.1   (0.0-0.7)  K/uL


 


Baso # (Auto)   0.0   (0.0-0.1)  K/uL


 


Nucleated RBC %   0.0   /100WBC


 


Nucleated RBCs #   0   K/uL


 


Lactate     (0.20-2.00)  mmol/L


 


Sodium    139  (136-145)  mmol/L


 


Potassium    4.8  (3.5-5.1)  mmol/L


 


Chloride    104  ()  mmol/L


 


Carbon Dioxide    32.1 H  (21.0-32.0)  mmol/L


 


BUN    33 H  (7.0-18.0)  mg/dL


 


Creatinine    2.5 H  (0.6-1.0)  mg/dL


 


Est Cr Clr Drug Dosing    16.33  mL/min


 


Estimated GFR (MDRD)    18.8  ml/min


 


Glucose    86  ()  mg/dL


 


Calcium    7.9 L  (8.5-10.1)  mg/dL


 


Total Bilirubin    0.2  (0.2-1.0)  mg/dL


 


AST    30  (15-37)  IU/L


 


ALT    32  (14-63)  IU/L


 


Alkaline Phosphatase    76  ()  U/L


 


Total Protein    6.4  (6.4-8.2)  g/dL


 


Albumin    2.8 L  (3.4-5.0)  g/dL


 


Globulin    3.6 H  (2.0-3.5)  g/dL


 


Albumin/Globulin Ratio    0.8 L  (1.3-2.8)  


 


Urine Color  YELLOW    


 


Urine Appearance  SLT CLOUDY    


 


Urine pH  5.5    (5.0-8.0)  


 


Ur Specific Gravity  1.025    (1.001-1.035)  


 


Urine Protein  30    (NEGATIVE)  mg/dL


 


Urine Glucose (UA)  NEGATIVE    (NEGATIVE)  mg/dL


 


Urine Ketones  NEGATIVE    (NEGATIVE)  mg/dL


 


Urine Occult Blood  NEGATIVE    (NEGATIVE)  


 


Urine Nitrite  NEGATIVE    (NEGATIVE)  


 


Urine Bilirubin  NEGATIVE    (NEGATIVE)  


 


Urine Urobilinogen  0.2    (<2.0)  EU/dL


 


Ur Leukocyte Esterase  TRACE    (NEGATIVE)  


 


Urine RBC  0-2    (0-2/HPF)  


 


Urine WBC  4-7    (0-5/HPF)  


 


Ur Epithelial Cells  FEW    (NONE-FEW)  


 


Urine Bacteria  1+ H    (NEGATIVE)  


 


Hyaline Casts  0-2    (0-2/LPF)  


 


Fine Granular Casts  0-1    (NEGATIVE)  


 


Urine Mucus  MODERATE    (NONE-MOD)  











Med Orders - Current: 


 Current Medications





Albuterol/Ipratropium (Duoneb 3.0-0.5 Mg/3 Ml)  3 ml NEB Q4HRRT IRVIN


   Last Admin: 04/03/18 10:52 Dose:  3 ml


Heparin Sodium (Porcine) (Heparin Sodium)  5,000 units SUBCUT Q12H RIVIN


Hydrocortisone (Cortef)  10 mg PO QPM IRVIN


Hydrocortisone (Cortef)  15 mg PO QAM IRVIN


   Last Admin: 04/03/18 08:40 Dose:  15 mg


Lactated Ringer's (Ringers, Lactated)  1,000 mls @ 999 mls/hr IV .BOLUS [facility]


   Last Infusion: 04/02/18 19:13 Dose:  Infused


Levothyroxine Sodium (Synthroid)  75 mcg PO ACBRK [facility]


   Last Admin: 04/03/18 06:30 Dose:  75 mcg


Propranolol HCl (Inderal)  40 mg PO BEDTIME [facility]


   Last Admin: 04/02/18 22:09 Dose:  40 mg


Sodium Chloride (Saline Flush)  10 ml FLUSH ASDIRECTED PRN


   PRN Reason: Keep Vein Open


Sodium Chloride (Saline Flush)  2.5 ml FLUSH ASDIRECTED PRN


   PRN Reason: Keep Vein Open


Tramadol HCl (Ultram)  50 mg PO Q6H PRN


   PRN Reason: Pain





Discontinued Medications





Albuterol/Ipratropium (Duoneb 3.0-0.5 Mg/3 Ml)  3 ml NEB ONETIME ONE


   Stop: 04/02/18 18:22


   Last Admin: 04/02/18 18:28 Dose:  3 ml


Enoxaparin Sodium (Lovenox)  40 mg SUBCUT Q24H [facility]


   Last Admin: 04/02/18 20:47 Dose:  40 mg


Ceftriaxone Sodium 1,000 mg/ (Sodium Chloride)  50 mls @ 200 mls/hr IV Q24H ONE


   Stop: 04/02/18 20:04


   Last Admin: 04/02/18 21:24 Dose:  Not Given


Lactated Ringer's (Ringers, Lactated)  1,000 mls @ 125 mls/hr IV ASDIRECTED [facility]


   Last Admin: 04/03/18 02:38 Dose:  125 mls/hr


Ceftriaxone Sodium/Dextrose (Rocephin In Dextrose,Iso-Osm 1 Gm/50 Ml)  50 mls @ 

200 mls/hr IV ONETIME ONE


   Stop: 04/02/18 21:14


   Last Admin: 04/02/18 21:14 Dose:  200 mls/hr


Ceftriaxone Sodium/Dextrose (Rocephin In Dextrose,Iso-Osm 1 Gm/50 Ml) Confirm 

Administered Dose 50 mls @ as directed .ROUTE .STK-MED ONE


   Stop: 04/02/18 21:10


   Last Admin: 04/02/18 21:56 Dose:  Not Given

## 2018-04-03 NOTE — CR
EXAM DATE: 18



PATIENT'S AGE: 72





Patient: KRISHNA TORRE



Facility: [place], [place]

Patient ID: [id number]

Site Patient ID: [id number].

Site Accession #: [id number]

: 1945

Study: XRay Chest [id number]/2/2018 5:19:42 PM

Ordering Physician: Doctor Temp



Final Report: 

INDICATION: cough, chest pain, sob - all x3 days



TECHNIQUE:

Chest 2 views.



COMPARISON:

16



FINDINGS:

Cardiovascular and mediastinum: Heart size and vasculature are normal in 
caliber and appearance. Mediastinum is within normal limits. 



Lungs and pleural spaces: Lungs are clear. No sign of infiltrate or mass. No 
sign of pleural effusion. No pneumothorax. 



Bones and soft tissues: No significant findings. 



IMPRESSION:

Unremarkable chest.





Dictated by: Shabbir Pack MD @ 2018 17:50:57

(Electronic Signature)



Report Signed by Proxy.
NIRAV

## 2018-04-24 ENCOUNTER — HOSPITAL ENCOUNTER (INPATIENT)
Dept: HOSPITAL 56 - MW.ED | Age: 73
LOS: 2 days | Discharge: HOME | DRG: 69 | End: 2018-04-26
Attending: INTERNAL MEDICINE | Admitting: INTERNAL MEDICINE
Payer: MEDICARE

## 2018-04-24 DIAGNOSIS — G45.9: ICD-10-CM

## 2018-04-24 DIAGNOSIS — E27.1: ICD-10-CM

## 2018-04-24 DIAGNOSIS — J44.1: ICD-10-CM

## 2018-04-24 DIAGNOSIS — I10: ICD-10-CM

## 2018-04-24 DIAGNOSIS — R09.02: ICD-10-CM

## 2018-04-24 DIAGNOSIS — Z87.891: ICD-10-CM

## 2018-04-24 DIAGNOSIS — Z86.79: ICD-10-CM

## 2018-04-24 DIAGNOSIS — Z79.899: ICD-10-CM

## 2018-04-24 DIAGNOSIS — R73.03: ICD-10-CM

## 2018-04-24 DIAGNOSIS — E78.5: ICD-10-CM

## 2018-04-24 DIAGNOSIS — K25.9: ICD-10-CM

## 2018-04-24 DIAGNOSIS — Z90.5: ICD-10-CM

## 2018-04-24 DIAGNOSIS — R20.2: ICD-10-CM

## 2018-04-24 DIAGNOSIS — I67.1: ICD-10-CM

## 2018-04-24 DIAGNOSIS — R47.01: Primary | ICD-10-CM

## 2018-04-24 DIAGNOSIS — E03.9: ICD-10-CM

## 2018-04-24 DIAGNOSIS — Z88.8: ICD-10-CM

## 2018-04-24 DIAGNOSIS — F17.200: ICD-10-CM

## 2018-04-24 DIAGNOSIS — Z86.73: ICD-10-CM

## 2018-04-24 DIAGNOSIS — Z88.0: ICD-10-CM

## 2018-04-24 LAB
CHLORIDE SERPL-SCNC: 104 MMOL/L (ref 98–107)
SODIUM SERPL-SCNC: 140 MMOL/L (ref 136–145)

## 2018-04-24 RX ADMIN — FLUTICASONE PROPIONATE AND SALMETEROL SCH PUFF: 50; 250 POWDER RESPIRATORY (INHALATION) at 23:53

## 2018-04-24 NOTE — PCM.HP
H&P History of Present Illness





- General


Date of Service: 04/24/18


Admit Problem/Dx: 


 Admission Diagnosis/Problem





Admission Diagnosis/Problem      slurred speech











- History of Present Illness


Initial Comments - Free Text/Narative: 





  Patient presented to hospital brought to [facility]  in the afternoon by her nephew 

due to slurred speech that started in the morning about 3 am . She suffers fro 

hypoxia  secondary to COPD and could  afford her O2 only for 1 week. At the 

moment of my examination she denies any tingling or numbness or weakness in the 

face or in the arms or legs , no blurry vision. She was a smoker for more than 

30 y, currently she is wheezing. Has Farwell disease and hypothyroidism , and a 

brain aneurysm of 1 cm.





Onset of Symptoms: Reports: Today


Symptom Onset Date: 04/24/18


Duration of Symptoms: Reports: Hour(s):


  ** Headache


Pain Score (Numeric/FACES): 0





- Related Data


Allergies/Adverse Reactions: 


 Allergies











Allergy/AdvReac Type Severity Reaction Status Date / Time


 


egg Allergy Mild Nausea Verified 04/24/18 16:43


 


codeine Allergy  Rash Verified 04/24/18 16:43


 


diazepam [From Valium] Allergy  Rash Verified 04/24/18 16:43


 


morphine Allergy  Agitation Verified 04/24/18 16:43


 


Penicillins Allergy  Other Verified 04/24/18 16:43











Home Medications: 


 Home Meds





Omeprazole 40 mg PO DAILY 07/28/14 [History]


Hydrocortisone 10 mg PO QPM 02/08/15 [History]


Hydrocortisone 15 mg PO QAM 02/08/15 [History]


Propranolol [Inderal] 40 mg PO DAILY 09/30/16 [History]


Calcium Carbonate [Calcium] 500 mg PO ASDIRECTED 07/06/17 [History]


Vitamin B Complex 1 each PO DAILY 07/06/17 [History]


traMADol HCl [Tramadol HCl] 1 tab PO ASDIRECTED 07/06/17 [History]


Levothyroxine [Synthroid] 75 mcg PO ACBRK 04/02/18 [History]


Albuterol [IJD: Albuterol HFA] 1 puff .XX Q4H PRN 30 Days #8 gm 04/03/18 [Rx]


Cephalexin [Keflex] 500 mg PO Q8H 5 Days #15 cap 04/03/18 [Rx]


Fluticasone/Salmeterol [Advair 250-50] 1 puff INH BID 30 Days #1 diskus 04/03/ 18 [Rx]











Past Medical History


HEENT History: Reports: Impaired Vision


Cardiovascular History: Reports: Hypertension


Other Cardiovascular History: hypotension


Respiratory History: Reports: SOB


Gastrointestinal History: Reports: None


Genitourinary History: Reports: None


OB/GYN History: Reports: Pregnancy


Musculoskeletal History: Reports: None


Neurological History: Reports: Cerebral Aneurysms, CVA, Other (See Below)


Other Neuro History: brain tumor


Psychiatric History: Reports: None


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


Hematologic History: Reports: None


Immunologic History: Reports: None


Oncologic (Cancer) History: Reports: Other (See Below)


Other Oncologic History: brain tumors, treated with radiation


Dermatologic History: Reports: None





- Infectious Disease History


Infectious Disease History: Reports: Chicken Pox, Measles, Mumps


Other Infectious Disease History: childhood





- Past Surgical History


HEENT Surgical History: Reports: Other (See Below)


Other HEENT Surgeries/Procedures: benign brain tumors removed in [id number]


Female  Surgical History: Reports: Hysterectomy, Nephrectomy


Other Female  Surgeries/Procedures: only one kidney





Social & Family History





- Family History


Family Medical History: Noncontributory





- Tobacco Use


Smoking Status *Q: Former Smoker


Years of Tobacco use: 32


Packs/Tins Daily: 0.3


Used Tobacco, but Quit: Yes


Month/Year Tobacco Last Used: 


Second Hand Smoke Exposure: No





- Caffeine Use


Caffeine Use: Reports: Coffee, Tea





- Recreational Drug Use


Recreational Drug Use: No





H&P Review of Systems





- Review of Systems:


Review Of Systems: See Below


General: Reports: No Symptoms


HEENT: Reports: No Symptoms, Other ( slurred speach)


Pulmonary: Reports: Wheezing


Cardiovascular: Reports: No Symptoms


Gastrointestinal: Reports: No Symptoms


Genitourinary: Reports: No Symptoms


Musculoskeletal: Reports: No Symptoms


Skin: Reports: No Symptoms


Psychiatric: Reports: No Symptoms


Neurological: Reports: Change in Speech


Hematologic/Lymphatic: Reports: No Symptoms


Immunologic: Reports: No Symptoms





Exam





- Exam


Exam: See Below





- Vital Signs


Vital Signs: 


 Last Vital Signs











Temp  98.1 F   04/24/18 20:28


 


Pulse  70   04/24/18 20:28


 


Resp  18   04/24/18 20:28


 


BP  141/67 H  04/24/18 20:28


 


Pulse Ox  100   04/24/18 20:28











Weight: 241 lb 6.499 oz





- Exam


Quality Assessment: Supplemental Oxygen


General: Alert, Oriented


HEENT: Conjunctiva Clear, EACs Clear, EOMI, Posterior Pharynx Clear, Pupils 

Reactive


Neck: Supple, Trachea Midline, Full Range of Motion.  No: Lymphadenopathy


Lungs: Decreased Breath Sounds, Wheezing


Cardiovascular: Regular Rate, Regular Rhythm, Normal S1, Normal S2, Gallop/S4


GI/Abdominal Exam: Normal Bowel Sounds, Soft, Non-Tender, No Distention, No 

Abnormal Bruit, No Mass


Back Exam: Normal Inspection


Skin: Warm, Dry, Intact, Cool


Neurological: Cranial Nerves Intact


Neuro Extensive - Mental Status: Alert, Oriented x3





- Patient Data


Lab Results Last 24 hrs: 


 Laboratory Results - last 24 hr











  04/24/18 04/24/18 04/24/18 Range/Units





  17:04 17:04 17:04 


 


WBC  9.79    (4.0-11.0)  K/uL


 


RBC  3.42 L    (4.30-5.90)  M/uL


 


Hgb  10.4 L    (12.0-16.0)  g/dL


 


Hct  34.0 L    (36.0-46.0)  %


 


MCV  99.4 H    (80.0-98.0)  fL


 


MCH  30.4    (27.0-32.0)  pg


 


MCHC  30.6 L    (31.0-37.0)  g/dL


 


RDW Std Deviation  62.7 H    (28.0-62.0)  fl


 


RDW Coeff of Saray  17 H    (11.0-15.0)  %


 


Plt Count  288    (150-400)  K/uL


 


MPV  9.50    (7.40-12.00)  fL


 


Neut % (Auto)  73.8    (48.0-80.0)  %


 


Lymph % (Auto)  18.2    (16.0-40.0)  %


 


Mono % (Auto)  5.6    (0.0-15.0)  %


 


Eos % (Auto)  1.8    (0.0-7.0)  %


 


Baso % (Auto)  0.6    (0.0-1.5)  %


 


Neut # (Auto)  7.2 H    (1.4-5.7)  K/uL


 


Lymph # (Auto)  1.8    (0.6-2.4)  K/uL


 


Mono # (Auto)  0.6    (0.0-0.8)  K/uL


 


Eos # (Auto)  0.2    (0.0-0.7)  K/uL


 


Baso # (Auto)  0.1    (0.0-0.1)  K/uL


 


Nucleated RBC %  0.0    /100WBC


 


Nucleated RBCs #  0    K/uL


 


INR   0.98   


 


Sodium    140  (136-145)  mmol/L


 


Potassium    5.0  (3.5-5.1)  mmol/L


 


Chloride    104  ()  mmol/L


 


Carbon Dioxide    33.2 H  (21.0-32.0)  mmol/L


 


BUN    42 H  (7.0-18.0)  mg/dL


 


Creatinine    2.2 H  (0.6-1.0)  mg/dL


 


Est Cr Clr Drug Dosing    TNP  


 


Estimated GFR (MDRD)    21.8  ml/min


 


Glucose    101  ()  mg/dL


 


Calcium    8.6  (8.5-10.1)  mg/dL


 


Total Bilirubin    0.2  (0.2-1.0)  mg/dL


 


AST    21  (15-37)  IU/L


 


ALT    25  (14-63)  IU/L


 


Alkaline Phosphatase    70  ()  U/L


 


Troponin I    < 0.050  (0.000-0.056)  ng/mL


 


Total Protein    6.7  (6.4-8.2)  g/dL


 


Albumin    3.1 L  (3.4-5.0)  g/dL


 


Globulin    3.6 H  (2.0-3.5)  g/dL


 


Albumin/Globulin Ratio    0.9 L  (1.3-2.8)  


 


TSH 3rd Generation    0.29 L  (0.36-3.74)  uIU/mL











Result Diagrams: 


 04/25/18 05:05





 04/25/18 05:05





- Problem List


(1) Cerebrovascular accident (CVA)


SNOMED Code(s): 283317741


   ICD Code: I63.9 - CEREBRAL INFARCTION, UNSPECIFIED   Status: Acute   Current 

Visit: Yes   





(2) Hypothyroidism


SNOMED Code(s): 88526437


   ICD Code: E03.9 - HYPOTHYROIDISM, UNSPECIFIED   Status: Acute   Current Visit

: Yes   





(3) Adrenal insufficiency (Guille's disease)


SNOMED Code(s): 890759096


   ICD Code: E27.1 - PRIMARY ADRENOCORTICAL INSUFFICIENCY   Status: Acute   

Current Visit: No   





(4) Hypoxia


SNOMED Code(s): 911769341


   ICD Code: R09.02 - HYPOXEMIA   Status: Acute   Current Visit: No   





(5) COPD with exacerbation


SNOMED Code(s): 875941669


   ICD Code: J44.1 - CHRONIC OBSTRUCTIVE PULMONARY DISEASE W (ACUTE) 

EXACERBATION   Status: Acute   Current Visit: Yes   


Problem List Initiated/Reviewed/Updated: Yes


Orders Last 24hrs: 


 Active Orders 24 hr











 Category Date Time Status


 


 Patient Status [ADT] Stat ADT  04/24/18 18:12 Active


 


 Cardiac Monitoring [RC] .AS DIRECTED Care  04/24/18 16:44 Active


 


 Communication Order [RC] ROUTINE Care  04/24/18 22:10 Active


 


 EKG Documentation Completion [RC] STAT Care  04/24/18 16:44 Active


 


 Neuro Check [RC] Q2HR Care  04/24/18 22:11 Active


 


 Oxygen Therapy [RC] PRN Care  04/24/18 22:02 Active


 


 Pulse Oximetry [RC] PRN Care  04/24/18 22:02 Active


 


 RT Aerosol Therapy [RC] ASDIRECTED Care  04/24/18 22:04 Active


 


 RT Post Treatment Assessment [RC] Click to Edit Care  04/24/18 23:38 Active


 


 RT Pre-Treatment Assessment [RC] Click to Edit Care  04/24/18 23:38 Active


 


 Up With Assistance [RC] ASDIRECTED Care  04/24/18 22:02 Inactive


 


 Up ad Edith [RC] ASDIRECTED Care  04/24/18 22:02 Active


 


 VTE/DVT Education [RC] PER UNIT ROUTINE Care  04/24/18 22:02 Active


 


 Vital Signs [RC] Q4H Care  04/24/18 22:02 Active


 


 PT Evaluation and Treatment [CONS] Routine Cons  04/24/18 22:02 Active


 


 Chest 1V Frontal [CR] Stat Exams  04/24/18 16:45 Taken


 


 Head wo Cont [CT] Stat Exams  04/24/18 16:45 Taken


 


 BASIC METABOLIC PANEL,BMP [CHEM] AM Lab  04/25/18 05:11 Ordered


 


 BASIC METABOLIC PANEL,BMP [CHEM] AM Lab  04/26/18 05:11 Ordered


 


 BASIC METABOLIC PANEL,BMP [CHEM] AM Lab  04/27/18 05:11 Ordered


 


 BASIC METABOLIC PANEL,BMP [CHEM] AM Lab  04/28/18 05:11 Ordered


 


 CBC WITH AUTO DIFF [HEME] AM Lab  04/25/18 05:11 Ordered


 


 CBC WITH AUTO DIFF [HEME] AM Lab  04/26/18 05:11 Ordered


 


 CBC WITH AUTO DIFF [HEME] AM Lab  04/27/18 05:11 Ordered


 


 CBC WITH AUTO DIFF [HEME] AM Lab  04/28/18 05:11 Ordered


 


 MAGNESIUM [CHEM] AM Lab  04/25/18 05:11 Ordered


 


 MAGNESIUM [CHEM] AM Lab  04/26/18 05:11 Ordered


 


 MAGNESIUM [CHEM] AM Lab  04/27/18 05:11 Ordered


 


 MAGNESIUM [CHEM] AM Lab  04/28/18 05:11 Ordered


 


 UA W/MICROSCOPIC [URIN] Stat Lab  04/24/18 16:45 Ordered


 


 Albuterol/Ipratropium [DuoNeb 3.0-0.5 MG/3 ML] Med  04/24/18 22:02 Active





 3 ml NEB Q4HRRT PRN   


 


 Aspirin Med  04/24/18 22:45 Active





 300 mg RECTAL DAILY   


 


 Fluticasone/Salmeterol [Advair Diskus 250-50] Med  04/24/18 23:45 Active





 1 puff INH BID   


 


 Lactated Ringers [Ringers, Lactated] 1,000 ml Med  04/24/18 22:15 Active





 IV ASDIRECTED   


 


 Sodium Chloride 0.9% [Saline Flush] Med  04/24/18 16:44 Active





 10 ml FLUSH ASDIRECTED PRN   


 


 Sodium Chloride 0.9% [Saline Flush] Med  04/24/18 22:02 Active





 10 ml FLUSH ASDIRECTED PRN   


 


 Sodium Chloride 0.9% [Saline Flush] Med  04/24/18 16:44 Active





 2.5 ml FLUSH ASDIRECTED PRN   


 


 Sodium Chloride 0.9% [Saline Flush] Med  04/24/18 22:02 Active





 2.5 ml FLUSH ASDIRECTED PRN   


 


 atorvaSTATin [Lipitor] Med  04/24/18 22:35 Active





 40 mg PO BEDTIME   


 


 Peripheral IV Insertion Adult [OM.PC] Routine Oth  04/24/18 22:02 Ordered


 


 Saline Lock Insert [OM.PC] Stat Oth  04/24/18 16:44 Ordered


 


 Sequential Compression Device [OM.PC] Per Unit Routine Oth  04/24/18 22:02 

Ordered


 


 Resuscitation Status Routine Resus Stat  04/24/18 22:02 Ordered








 Medication Orders





Albuterol/Ipratropium (Duoneb 3.0-0.5 Mg/3 Ml)  3 ml NEB Q4HRRT PRN


   PRN Reason: Shortness Of Breath/wheezing


Aspirin (Aspirin)  300 mg RECTAL DAILY IRVIN


Atorvastatin Calcium (Lipitor)  40 mg PO BEDTIME IRVIN


   Last Admin: 04/24/18 22:53  Dose: 40 mg


Lactated Ringer's (Ringers, Lactated)  1,000 mls @ 125 mls/hr IV ASDIRECTED IRVIN


   Last Admin: 04/24/18 22:55  Dose: 125 mls/hr


Fluticasone/Salmeterol (Advair Diskus 250-50)  1 puff INH BID IRVIN


Sodium Chloride (Saline Flush)  10 ml FLUSH ASDIRECTED PRN


   PRN Reason: Keep Vein Open


Sodium Chloride (Saline Flush)  2.5 ml FLUSH ASDIRECTED PRN


   PRN Reason: Keep Vein Open


Sodium Chloride (Saline Flush)  10 ml FLUSH ASDIRECTED PRN


   PRN Reason: Keep Vein Open


Sodium Chloride (Saline Flush)  2.5 ml FLUSH ASDIRECTED PRN


   PRN Reason: Keep Vein Open





 Cerebrovascular accident - will admit patient to telemetry , NPO , bedside 

speech and swallow evaluation  by  the nurse , lipid profile , hemoglobin A1 c 

, carotid doppler , echo, neurochecks q 2 h for the first 8 h after that q 4 h 

for the next 8 h , and q 8 h after the first 24 h. 


  Will  give patient aspirin rectal 300 mg , and once she passed the speech and 

swallow evaluation she will get atorvastatin 40 mg po  daily.


   PT evaluation , hold BP meds , Iv fluids , keep blood pressure elevated for 

the first 24-48 h and treat only if BP more than 220/110 or signs of end organ 

damage. 


     Carotid doppler , ECHo, monitor in telemetry


2) COPD exac - will give patient duoneb neb treatment q 4 h prn , Advair 250/50 

1 puff inh BID , solumedrol 40 mg iv q 6 h


3) DVT prof : SCD


4)Hypoxia- please eval patient patient for O2 at home 


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 

reconciliation


 hypothyroidism: levothyroxine po 75 mcg po  daily

## 2018-04-24 NOTE — EDM.PDOC
ED HPI GENERAL MEDICAL PROBLEM





- General


Stated Complaint: SLURRING WORDS AND HEADACHE


Time Seen by Provider: 04/24/18 16:40





- History of Present Illness


INITIAL COMMENTS - FREE TEXT/NARRATIVE: 





HISTORY AND PHYSICAL:





History of present illness:


The patient is a 72-year-old female with a history of hypertension 

hypothyroidism UTIs and Sparkill's disease who also had a recent admission here 

the beginning of April for hypoxia of unclear etiology and was discharged on 

home O2 and also has a history of a full see form aneurysmal dilatation of the 

basilar tip on MRI done in the past who presents today with speech slurring and 

speech changes that started about 2:58 AM this morning. The patient says that 

it started and she feels like it is getting worse which is why she came in. She 

is not having trouble swallowing and she is not having any arm or leg weakness. 

She has not had any falls with this. She has no chest pain no abdominal pain no 

nausea or vomiting but does complain of a frontal headache. Patient came in via 

triage with a family member. The patient tells triage that she has a history of 

strokes in the past but per the computer she has a history of the intracranial 

aneurysm in the last testing that I see is an MR I MRA of the head done August 14, 2014 revealing the 7 x 8 mm fusiform aneurysmal dilatation of the basilar 

tip and aneurysmal clip within the right MCA distribution. According to the 

patient she had the one aneurysm clipped but the other one they were not able 

to do anything about. She does not have any neck pain currently. As for the 

oxygen she was discharged home with, she says that she has not had that for 2 

weeks so she has not been using it. The patient also tells nursing that her 

left arm and her left leg seemed a little bit tingly but they are not weak. The 

patient again reiterates that all the symptoms started at 2:58 AM this morning


Upon asking further by nursing the patient says that she could not afford to 

get the oxygen at home which is why she is not using on a regular basis


Review of systems: 


As per history of present illness and below otherwise all systems reviewed and 

negative.





Past medical history: 


As per history of present illness and as reviewed below otherwise 

noncontributory.





Surgical history: 


As per history of present illness and as reviewed below otherwise 

noncontributory.





Social history: 


No reported history of drug or alcohol abuse.





Family history: 


As per history of present illness and as reviewed below otherwise 

noncontributory.





Physical exam:


General: Well-developed well-nourished overweight female who is nontoxic and 

does have some slight garbling of her speech but it is very comprehensible and 

she is awake and alert and smiling. She is in no respiratory distress not 

breathless


HEENT: Atraumatic, normocephalic, pupils reactive, negative for conjunctival 

pallor or scleral icterus, mucous membranes moist, throat clear, neck supple, 

nontender, trachea midline.


Lungs: Clear to auscultation with coarse breath sounds bilaterally, breath 

sounds equal bilaterally, chest nontender. No work of breathing or sensory 

muscle use and no stridor


Heart: S1S2, regular and rhythm no overt murmurs


Abdomen: Soft, nondistended, nontender. Negative for masses or 

hepatosplenomegaly. Negative for costovertebral tenderness.


Pelvis: Stable nontender.


Genitourinary: Deferred.


Rectal: Deferred.


Extremities: Atraumatic, negative for cords or calf pain. Neurovascular 

unremarkable. She has full range of motion of all extremities


Neuro: Awake, alert, oriented. Cranial nerves II through XII unremarkable. 

Cerebellum unremarkable. Motor and sensory unremarkable throughout. Exam 

nonfocal. Strength in  and with kick is good 5/5 throughout and there is 

normal tone. Speech is as described above.





Diagnostics:


CT scan of the head chest x-ray EKG CBC CMP INR troponin TSH UA


NIHSS





Therapeutics:


IV O2 monitor aspirin





At this point the patient is timing the start of her symptoms at 2-3 AM this 

morning which is significantly outside of the window for TPA and her deficit 

also is not significant enough for TPA administration. She also has a history 

of an active aneurysm which is not leaking and is unchanged per the CT scan. 

The patient's NIHSS = 4, which she lost points for slight ataxia of left leg as 

well as sensation changes on left arm and leg and the speech. The patient tells 

me that all of these symptoms started simultaneously





Repeat nihss=3 with significant improvement of her speech. I discussed this 

case with the hospitalist Dr. Iyer  @[phone] who accepts the patient for 

telemetry inpatient admission. She is aware that we have some pending labs and 

that I will follow-up on those. I discussed admission with the patient and she 

is agreeable.





Labs from today were compared to the prior ones performed on the last 

admission. BUN and creatinine him to be consistent with prior values. I 

discussed this case with the hospitalist Dr. GUTIERREZ  AT [phone] Who accepts the 

patient for inpatient admission to telemetry. I discussed all testing results 

with the patient and family at bedside. She has not produced a urine sample yet 

and will not hold her in the ED for that. Will inform the floor to please get a 

sample for her





Please note that the patient is not a candidate for TPA as described above and 

currently her stroke scale is improving.





Impression: 


Expressive  aphasia with paresthesia of the left upper and lower extremities 

mild, rule out TIA versus CVA


Definitive disposition and diagnosis as appropriate pending reevaluation and 

review of above.


  ** Headache


Pain Score (Numeric/FACES): 8





- Related Data


 Allergies











Allergy/AdvReac Type Severity Reaction Status Date / Time


 


egg Allergy Mild Nausea Verified 04/24/18 16:43


 


codeine Allergy  Rash Verified 04/24/18 16:43


 


diazepam [From Valium] Allergy  Rash Verified 04/24/18 16:43


 


morphine Allergy  Agitation Verified 04/24/18 16:43


 


Penicillins Allergy  Other Verified 04/24/18 16:43











Home Meds: 


 Home Meds





Omeprazole 40 mg PO DAILY 07/28/14 [History]


Hydrocortisone 10 mg PO QPM 02/08/15 [History]


Hydrocortisone 15 mg PO QAM 02/08/15 [History]


Propranolol [Inderal] 40 mg PO DAILY 09/30/16 [History]


Calcium Carbonate [Calcium] 500 mg PO ASDIRECTED 07/06/17 [History]


Vitamin B Complex 1 each PO DAILY 07/06/17 [History]


traMADol HCl [Tramadol HCl] 1 tab PO ASDIRECTED 07/06/17 [History]


Levothyroxine [Synthroid] 75 mcg PO ACBRK 04/02/18 [History]


Albuterol [IJD: Albuterol HFA] 1 puff .XX Q4H PRN 30 Days #8 gm 04/03/18 [Rx]


Cephalexin [Keflex] 500 mg PO Q8H 5 Days #15 cap 04/03/18 [Rx]


Fluticasone/Salmeterol [Advair 250-50] 1 puff INH BID 30 Days #1 diskus 04/03/ 18 [Rx]











Past Medical History


HEENT History: Reports: Impaired Vision


Cardiovascular History: Reports: Hypertension


Other Cardiovascular History: hypotension


Respiratory History: Reports: SOB


Gastrointestinal History: Reports: None


Genitourinary History: Reports: None


OB/GYN History: Reports: Pregnancy


Musculoskeletal History: Reports: None


Neurological History: Reports: Cerebral Aneurysms, CVA, Other (See Below)


Other Neuro History: brain tumor


Psychiatric History: Reports: None


Endocrine/Metabolic History: Reports: Sparkill's Disease, Hypothyroidism


Hematologic History: Reports: None


Immunologic History: Reports: None


Oncologic (Cancer) History: Reports: Other (See Below)


Other Oncologic History: brain tumors, treated with radiation


Dermatologic History: Reports: None





- Infectious Disease History


Infectious Disease History: Reports: Chicken Pox, Measles, Mumps


Other Infectious Disease History: childhood





- Past Surgical History


HEENT Surgical History: Reports: Other (See Below)


Other HEENT Surgeries/Procedures: benign brain tumors removed in [id number]


Female  Surgical History: Reports: Hysterectomy, Nephrectomy


Other Female  Surgeries/Procedures: only one kidney





Social & Family History





- Family History


Family Medical History: Noncontributory





- Tobacco Use


Smoking Status *Q: Former Smoker


Years of Tobacco use: 32


Packs/Tins Daily: 0.3


Used Tobacco, but Quit: Yes


Month/Year Tobacco Last Used: 


Second Hand Smoke Exposure: No





- Caffeine Use


Caffeine Use: Reports: Coffee, Tea





- Recreational Drug Use


Recreational Drug Use: No





ED ROS GENERAL





- Review of Systems


Review Of Systems: ROS reveals no pertinent complaints other than HPI.





ED EXAM, GENERAL





- Physical Exam


Exam: See Below (See dictation)





Course





- Vital Signs


Last Recorded V/S: 


 Last Vital Signs











Temp  36.8 C   04/24/18 16:44


 


Pulse  85   04/24/18 16:44


 


Resp  18   04/24/18 16:44


 


BP  140/42 L  04/24/18 16:44


 


Pulse Ox  95   04/24/18 16:44














- Orders/Labs/Meds


Orders: 


 Active Orders 24 hr











 Category Date Time Status


 


 Blood Glucose Check, Bedside [RC] ONETIME Care  04/24/18 16:44 Active


 


 Cardiac Monitoring [RC] .AS DIRECTED Care  04/24/18 16:44 Active


 


 Communication Order [RC] STAT Care  04/24/18 16:45 Active


 


 EKG Documentation Completion [RC] STAT Care  04/24/18 16:44 Active


 


 Oxygen Therapy, ED [RC] ASDIRECTED Care  04/24/18 16:44 Active


 


 Pulse Oximetry [RC] ASDIRECTED Care  04/24/18 16:44 Active


 


 Chest 1V Frontal [CR] Stat Exams  04/24/18 16:45 Taken


 


 Head wo Cont [CT] Stat Exams  04/24/18 16:45 Taken


 


 UA W/MICROSCOPIC [URIN] Stat Lab  04/24/18 16:45 Ordered


 


 Sodium Chloride 0.9% [Saline Flush] Med  04/24/18 16:44 Active





 10 ml FLUSH ASDIRECTED PRN   


 


 Sodium Chloride 0.9% [Saline Flush] Med  04/24/18 16:44 Active





 2.5 ml FLUSH ASDIRECTED PRN   


 


 Saline Lock Insert [OM.PC] Stat Oth  04/24/18 16:44 Ordered








 Medication Orders





Sodium Chloride (Saline Flush)  10 ml FLUSH ASDIRECTED PRN


   PRN Reason: Keep Vein Open


Sodium Chloride (Saline Flush)  2.5 ml FLUSH ASDIRECTED PRN


   PRN Reason: Keep Vein Open








Labs: 


 Laboratory Tests











  04/24/18 04/24/18 04/24/18 Range/Units





  17:04 17:04 17:04 


 


WBC  9.79    (4.0-11.0)  K/uL


 


RBC  3.42 L    (4.30-5.90)  M/uL


 


Hgb  10.4 L    (12.0-16.0)  g/dL


 


Hct  34.0 L    (36.0-46.0)  %


 


MCV  99.4 H    (80.0-98.0)  fL


 


MCH  30.4    (27.0-32.0)  pg


 


MCHC  30.6 L    (31.0-37.0)  g/dL


 


RDW Std Deviation  62.7 H    (28.0-62.0)  fl


 


RDW Coeff of Saray  17 H    (11.0-15.0)  %


 


Plt Count  288    (150-400)  K/uL


 


MPV  9.50    (7.40-12.00)  fL


 


Neut % (Auto)  73.8    (48.0-80.0)  %


 


Lymph % (Auto)  18.2    (16.0-40.0)  %


 


Mono % (Auto)  5.6    (0.0-15.0)  %


 


Eos % (Auto)  1.8    (0.0-7.0)  %


 


Baso % (Auto)  0.6    (0.0-1.5)  %


 


Neut # (Auto)  7.2 H    (1.4-5.7)  K/uL


 


Lymph # (Auto)  1.8    (0.6-2.4)  K/uL


 


Mono # (Auto)  0.6    (0.0-0.8)  K/uL


 


Eos # (Auto)  0.2    (0.0-0.7)  K/uL


 


Baso # (Auto)  0.1    (0.0-0.1)  K/uL


 


Nucleated RBC %  0.0    /100WBC


 


Nucleated RBCs #  0    K/uL


 


INR   0.98   


 


Sodium    140  (136-145)  mmol/L


 


Potassium    5.0  (3.5-5.1)  mmol/L


 


Chloride    104  ()  mmol/L


 


Carbon Dioxide    33.2 H  (21.0-32.0)  mmol/L


 


BUN    42 H  (7.0-18.0)  mg/dL


 


Creatinine    2.2 H  (0.6-1.0)  mg/dL


 


Est Cr Clr Drug Dosing    TNP  


 


Estimated GFR (MDRD)    21.8  ml/min


 


Glucose    101  ()  mg/dL


 


Calcium    8.6  (8.5-10.1)  mg/dL


 


Total Bilirubin    0.2  (0.2-1.0)  mg/dL


 


AST    21  (15-37)  IU/L


 


ALT    25  (14-63)  IU/L


 


Alkaline Phosphatase    70  ()  U/L


 


Troponin I    < 0.050  (0.000-0.056)  ng/mL


 


Total Protein    6.7  (6.4-8.2)  g/dL


 


Albumin    3.1 L  (3.4-5.0)  g/dL


 


Globulin    3.6 H  (2.0-3.5)  g/dL


 


Albumin/Globulin Ratio    0.9 L  (1.3-2.8)  


 


TSH 3rd Generation    0.29 L  (0.36-3.74)  uIU/mL











Meds: 


Medications











Generic Name Dose Route Start Last Admin





  Trade Name Freq  PRN Reason Stop Dose Admin


 


Sodium Chloride  10 ml  04/24/18 16:44  





  Saline Flush  FLUSH   





  ASDIRECTED PRN   





  Keep Vein Open   





     





     





     


 


Sodium Chloride  2.5 ml  04/24/18 16:44  





  Saline Flush  FLUSH   





  ASDIRECTED PRN   





  Keep Vein Open   





     





     





     














Departure





- Departure


Time of Disposition: 18:06


Disposition: Admitted As Inpatient 64


Condition: Good


Clinical Impression: 


 Expressive aphasia, Paresthesia








- Discharge Information


Referrals: 


Eliot Francois MD [Primary Care Provider] - 





- My Orders


Last 24 Hours: 


My Active Orders





04/24/18 16:44


Blood Glucose Check, Bedside [RC] ONETIME 


Cardiac Monitoring [RC] .AS DIRECTED 


EKG Documentation Completion [RC] STAT 


Oxygen Therapy, ED [RC] ASDIRECTED 


Pulse Oximetry [RC] ASDIRECTED 


Sodium Chloride 0.9% [Saline Flush]   10 ml FLUSH ASDIRECTED PRN 


Sodium Chloride 0.9% [Saline Flush]   2.5 ml FLUSH ASDIRECTED PRN 


Saline Lock Insert [OM.PC] Stat 





04/24/18 16:45


Communication Order [RC] STAT 


Chest 1V Frontal [CR] Stat 


Head wo Cont [CT] Stat 


UA W/MICROSCOPIC [URIN] Stat 














- Assessment/Plan


Last 24 Hours: 


My Active Orders





04/24/18 16:44


Blood Glucose Check, Bedside [RC] ONETIME 


Cardiac Monitoring [RC] .AS DIRECTED 


EKG Documentation Completion [RC] STAT 


Oxygen Therapy, ED [RC] ASDIRECTED 


Pulse Oximetry [RC] ASDIRECTED 


Sodium Chloride 0.9% [Saline Flush]   10 ml FLUSH ASDIRECTED PRN 


Sodium Chloride 0.9% [Saline Flush]   2.5 ml FLUSH ASDIRECTED PRN 


Saline Lock Insert [OM.PC] Stat 





04/24/18 16:45


Communication Order [RC] STAT 


Chest 1V Frontal [CR] Stat 


Head wo Cont [CT] Stat 


UA W/MICROSCOPIC [URIN] Stat

## 2018-04-25 LAB
CHLORIDE SERPL-SCNC: 103 MMOL/L (ref 98–107)
SODIUM SERPL-SCNC: 138 MMOL/L (ref 136–145)

## 2018-04-25 RX ADMIN — METHYLPREDNISOLONE SODIUM SUCCINATE SCH MG: 40 INJECTION, POWDER, FOR SOLUTION INTRAMUSCULAR; INTRAVENOUS at 21:28

## 2018-04-25 RX ADMIN — METHYLPREDNISOLONE SODIUM SUCCINATE SCH MG: 40 INJECTION, POWDER, FOR SOLUTION INTRAMUSCULAR; INTRAVENOUS at 10:54

## 2018-04-25 RX ADMIN — FLUTICASONE PROPIONATE AND SALMETEROL SCH PUFF: 50; 250 POWDER RESPIRATORY (INHALATION) at 20:14

## 2018-04-25 RX ADMIN — FLUTICASONE PROPIONATE AND SALMETEROL SCH PUFF: 50; 250 POWDER RESPIRATORY (INHALATION) at 09:14

## 2018-04-25 NOTE — CT
EXAM DATE: 18



PATIENT'S AGE: 72





Patient: KRISHNA TORRE



Facility: [place], [place]

Patient ID: [id number]

Site Patient ID: [id number].

Site Accession #: [id number]

: 1945

Study: CT Head STROKE PROTOCOL [id number]/24/2018 5:08:37 PM

Ordering Physician: Feliciano Smith



Final Report: 

INDICATION:

Slurred speech. Headache. Stroke protocol



TECHNIQUE:

CT head without contrast.



COMPARISON:

2016 



FINDINGS:

Post right frontotemporal craniotomy changes and aneurysm clips in the region 
of the right MCA, are again seen. There is cerebral and cerebellar cortical 
atrophy. The ventricles demonstrate normal configuration and size for the 
patient`s age. There is no mass effect or midline shift. Foci of 
encephalomalacia are again seen in the inferior aspect of the right frontal 
lobe and medial aspect of the right temporal lobe. Chronic lacunar infarcts are 
again seen in the left internal and external capsules adjacent to the caudate 
head. White matter hypodensities are suggestive of chronic small vessel 
ischemic changes. There is no loss of gray-white differentiation. There is no 
evidence of an acute intracranial hemorrhage. A basilar tip aneurysm is again 
seen, measuring 1.1 x 0.7 centimeters on image 21. 

No acute calvarial fracture is seen. 

There is persistent opacification of the left sphenoid sinus and a mucosal 
retention cyst or polyp with mild adjacent mucosal thickening in the left 
maxillary sinus. The mastoid air cells are clear. The visualized orbits are 
stable. 



IMPRESSION:

No evidence of an acute intracranial hemorrhage, mass effect or loss of gray-
white differentiation. Atrophy, chronic ischemic and post surgical changes.

A 1.1 centimeter basilar tip aneurysm again seen.

Paranasal sinus disease.



The findings were communicated Dr. Wiggins on 2015 at 5:22 p.m..



Dictated by Mono Brewer MD @ 2018 5:24:04 PM





Dictated by: Mono Brewer MD @ 2018 17:24:10

(Electronic Signature)



Report Signed by Proxy.
[facility]

## 2018-04-25 NOTE — CR
EXAM DATE: 18



PATIENT'S AGE: 72





Patient: KRISHNA TORRE



Facility: [place], [place]

Patient ID: [id number]

Site Patient ID: [id number].

Site Accession #: [id number]

: 1945

Study: XRay Chest [id number]/24/2018 5:19:43 PM

Ordering Physician: Doctor Temp



Final Report: 

INDICATION:

Stroke



TECHNIQUE:

Chest 1 views



COMPARISON:

2018



FINDINGS:

Cardiovascular and mediastinum: Heart size and vasculature are normal in 
caliber and appearance. 



Lungs and pleural spaces: Lungs are clear. No sign of infiltrate or mass. No 
sign of pleural effusion. No pneumothorax. 



Bones and soft tissues: No significant findings.



IMPRESSION:

No acute findings and no significant changes from the prior exam.





Dictated by Obie Lucas MD @ 2018 5:54PM

(Electronic Signature)



Report Signed by Proxy.
[facility]IR[facility]

## 2018-04-25 NOTE — PCM.PN
- General Info


Date of Service: 04/25/18


Admission Dx/Problem (Free Text): 


 Admission Diagnosis/Problem





Admission Diagnosis/Problem      slurred speech








Subjective Update: 





Continues to have some garbled speech, she reports she thinks it is better. 

Reports headache, which is located all over. Denies neck pain. No visual 

changes. No chest pain or SOB. No limb weakness or parathesias. 


Functional Status: Reports: Pain Controlled, Tolerating Diet, Ambulating, 

Urinating





- Review of Systems


General: Reports: No Symptoms.  Denies: Fever, Weakness, Fatigue


HEENT: Reports: Headaches.  Denies: Sinus Congestion, Sore Throat, Visual 

Changes


Pulmonary: Reports: No Symptoms.  Denies: Shortness of Breath, Cough, Sputum


Cardiovascular: Reports: No Symptoms.  Denies: Chest Pain, Edema


Gastrointestinal: Reports: No Symptoms.  Denies: Abdominal Pain, Nausea, 

Vomiting


Genitourinary: Reports: No Symptoms.  Denies: Dysuria, Frequency, Burning


Musculoskeletal: Denies: Neck Pain, Back Pain


Skin: Reports: No Symptoms


Neurological: Reports: Headache, Change in Speech.  Denies: Dizziness, 

Paresthesia


Psychiatric: Reports: No Symptoms.  Denies: Confusion





- Patient Data


Vitals - Most Recent: 


 Last Vital Signs











Temp  98.9 F   04/25/18 04:37


 


Pulse  80   04/25/18 04:37


 


Resp  19   04/25/18 04:37


 


BP  142/76 H  04/25/18 04:37


 


Pulse Ox  96   04/25/18 04:37











Weight - Most Recent: 109.5 kg


I&O - Last 24 Hours: 


 Intake & Output











 04/24/18 04/25/18 04/25/18





 22:59 06:59 14:59


 


Intake Total  760 


 


Output Total  500 


 


Balance  260 











Lab Results Last 24 Hours: 


 Laboratory Results - last 24 hr











  04/24/18 04/24/18 04/24/18 Range/Units





  17:04 17:04 17:04 


 


WBC  9.79    (4.0-11.0)  K/uL


 


RBC  3.42 L    (4.30-5.90)  M/uL


 


Hgb  10.4 L    (12.0-16.0)  g/dL


 


Hct  34.0 L    (36.0-46.0)  %


 


MCV  99.4 H    (80.0-98.0)  fL


 


MCH  30.4    (27.0-32.0)  pg


 


MCHC  30.6 L    (31.0-37.0)  g/dL


 


RDW Std Deviation  62.7 H    (28.0-62.0)  fl


 


RDW Coeff of Saray  17 H    (11.0-15.0)  %


 


Plt Count  288    (150-400)  K/uL


 


MPV  9.50    (7.40-12.00)  fL


 


Neut % (Auto)  73.8    (48.0-80.0)  %


 


Lymph % (Auto)  18.2    (16.0-40.0)  %


 


Mono % (Auto)  5.6    (0.0-15.0)  %


 


Eos % (Auto)  1.8    (0.0-7.0)  %


 


Baso % (Auto)  0.6    (0.0-1.5)  %


 


Neut # (Auto)  7.2 H    (1.4-5.7)  K/uL


 


Lymph # (Auto)  1.8    (0.6-2.4)  K/uL


 


Mono # (Auto)  0.6    (0.0-0.8)  K/uL


 


Eos # (Auto)  0.2    (0.0-0.7)  K/uL


 


Baso # (Auto)  0.1    (0.0-0.1)  K/uL


 


Add Manual Diff     


 


Neutrophils % (Manual)     (48.0-80.0)  %


 


Band Neutrophils %     %


 


Lymphocytes % (Manual)     (16.0-40.0)  %


 


Monocytes % (Manual)     (0.0-15.0)  %


 


Eosinophils % (Manual)     (0.0-7.0)  %


 


Basophils % (Manual)     (0.0-1.5)  %


 


Nucleated RBC %  0.0    /100WBC


 


Absolute Seg Neuts     (1.4-5.7)  


 


Band Neutrophils #     


 


Lymphocytes # (Manual)     (0.6-2.4)  


 


Monocytes # (Manual)     (0.0-0.8)  


 


Eosinophils # (Manual)     (0.0-0.7)  


 


Basophils # (Manual)     (0.0-0.1)  


 


Nucleated RBCs #  0    K/uL


 


INR   0.98   


 


Sodium    140  (136-145)  mmol/L


 


Potassium    5.0  (3.5-5.1)  mmol/L


 


Chloride    104  ()  mmol/L


 


Carbon Dioxide    33.2 H  (21.0-32.0)  mmol/L


 


BUN    42 H  (7.0-18.0)  mg/dL


 


Creatinine    2.2 H  (0.6-1.0)  mg/dL


 


Est Cr Clr Drug Dosing    TNP  


 


Estimated GFR (MDRD)    21.8  ml/min


 


Glucose    101  ()  mg/dL


 


Calcium    8.6  (8.5-10.1)  mg/dL


 


Magnesium     (1.5-2.0)  mg/dL


 


Total Bilirubin    0.2  (0.2-1.0)  mg/dL


 


AST    21  (15-37)  IU/L


 


ALT    25  (14-63)  IU/L


 


Alkaline Phosphatase    70  ()  U/L


 


Troponin I    < 0.050  (0.000-0.056)  ng/mL


 


Total Protein    6.7  (6.4-8.2)  g/dL


 


Albumin    3.1 L  (3.4-5.0)  g/dL


 


Globulin    3.6 H  (2.0-3.5)  g/dL


 


Albumin/Globulin Ratio    0.9 L  (1.3-2.8)  


 


TSH 3rd Generation    0.29 L  (0.36-3.74)  uIU/mL














  04/25/18 04/25/18 Range/Units





  05:05 05:05 


 


WBC  8.56   (4.0-11.0)  K/uL


 


RBC  3.20 L   (4.30-5.90)  M/uL


 


Hgb  9.8 L   (12.0-16.0)  g/dL


 


Hct  32.1 L   (36.0-46.0)  %


 


MCV  100.3 H   (80.0-98.0)  fL


 


MCH  30.6   (27.0-32.0)  pg


 


MCHC  30.5 L   (31.0-37.0)  g/dL


 


RDW Std Deviation  62.7 H   (28.0-62.0)  fl


 


RDW Coeff of Saray  17 H   (11.0-15.0)  %


 


Plt Count  280   (150-400)  K/uL


 


MPV  10.10   (7.40-12.00)  fL


 


Neut % (Auto)    (48.0-80.0)  %


 


Lymph % (Auto)    (16.0-40.0)  %


 


Mono % (Auto)    (0.0-15.0)  %


 


Eos % (Auto)    (0.0-7.0)  %


 


Baso % (Auto)    (0.0-1.5)  %


 


Neut # (Auto)    (1.4-5.7)  K/uL


 


Lymph # (Auto)    (0.6-2.4)  K/uL


 


Mono # (Auto)    (0.0-0.8)  K/uL


 


Eos # (Auto)    (0.0-0.7)  K/uL


 


Baso # (Auto)    (0.0-0.1)  K/uL


 


Add Manual Diff  YES   


 


Neutrophils % (Manual)  66   (48.0-80.0)  %


 


Band Neutrophils %  8   %


 


Lymphocytes % (Manual)  13 L   (16.0-40.0)  %


 


Monocytes % (Manual)  6   (0.0-15.0)  %


 


Eosinophils % (Manual)  3   (0.0-7.0)  %


 


Basophils % (Manual)  4 H   (0.0-1.5)  %


 


Nucleated RBC %  2.4   /100WBC


 


Absolute Seg Neuts  5.6   (1.4-5.7)  


 


Band Neutrophils #  0.7   


 


Lymphocytes # (Manual)  1.1   (0.6-2.4)  


 


Monocytes # (Manual)  0.5   (0.0-0.8)  


 


Eosinophils # (Manual)  0.3   (0.0-0.7)  


 


Basophils # (Manual)  0.3 H   (0.0-0.1)  


 


Nucleated RBCs #  0   K/uL


 


INR    


 


Sodium   138  (136-145)  mmol/L


 


Potassium   4.7  (3.5-5.1)  mmol/L


 


Chloride   103  ()  mmol/L


 


Carbon Dioxide   30.2  (21.0-32.0)  mmol/L


 


BUN   38 H  (7.0-18.0)  mg/dL


 


Creatinine   2.1 H  (0.6-1.0)  mg/dL


 


Est Cr Clr Drug Dosing   19.44  


 


Estimated GFR (MDRD)   23.0  ml/min


 


Glucose   82  ()  mg/dL


 


Calcium   8.3 L  (8.5-10.1)  mg/dL


 


Magnesium   1.5  (1.5-2.0)  mg/dL


 


Total Bilirubin    (0.2-1.0)  mg/dL


 


AST    (15-37)  IU/L


 


ALT    (14-63)  IU/L


 


Alkaline Phosphatase    ()  U/L


 


Troponin I    (0.000-0.056)  ng/mL


 


Total Protein    (6.4-8.2)  g/dL


 


Albumin    (3.4-5.0)  g/dL


 


Globulin    (2.0-3.5)  g/dL


 


Albumin/Globulin Ratio    (1.3-2.8)  


 


TSH 3rd Generation    (0.36-3.74)  uIU/mL











Med Orders - Current: 


 Current Medications





Albuterol/Ipratropium (Duoneb 3.0-0.5 Mg/3 Ml)  3 ml NEB Q4HRRT PRN


   PRN Reason: Shortness Of Breath/wheezing


Aspirin (Aspirin)  300 mg RECTAL DAILY [facility]


   Last Admin: 04/25/18 01:01 Dose:  300 mg


Atorvastatin Calcium (Lipitor)  40 mg PO BEDTIME [facility]


   Last Admin: 04/24/18 22:53 Dose:  40 mg


Fluticasone/Salmeterol (Advair Diskus 250-50)  1 puff INH BID [facility]


   Last Admin: 04/24/18 23:53 Dose:  1 puff


Sodium Chloride (Saline Flush)  10 ml FLUSH ASDIRECTED PRN


   PRN Reason: Keep Vein Open


Sodium Chloride (Saline Flush)  2.5 ml FLUSH ASDIRECTED PRN


   PRN Reason: Keep Vein Open


Sodium Chloride (Saline Flush)  10 ml FLUSH ASDIRECTED PRN


   PRN Reason: Keep Vein Open


Sodium Chloride (Saline Flush)  2.5 ml FLUSH ASDIRECTED PRN


   PRN Reason: Keep Vein Open





Discontinued Medications





Aspirin (Aspirin)  325 mg PO ONETIME ONE


   Stop: 04/24/18 18:13


   Last Admin: 04/24/18 18:41 Dose:  Not Given


Atorvastatin Calcium (Lipitor)  40 mg PO BEDTIME [facility]


Hydrocortisone (Cortef)  10 mg PO ONETIME ONE


   Stop: 04/24/18 22:32


   Last Admin: 04/24/18 23:46 Dose:  10 mg


Lactated Ringer's (Ringers, Lactated)  1,000 mls @ 125 mls/hr IV ASDIRECTED [facility]


   Last Admin: 04/24/18 22:55 Dose:  125 mls/hr


Lactated Ringer's (Ringers, Lactated)  1,000 mls @ 100 mls/hr IV ASDIRECTED [facility]











- Exam


General: Alert, Oriented, Cooperative, No Acute Distress


Neck: Supple


Lungs: Clear to Auscultation, Normal Respiratory Effort


Cardiovascular: Regular Rate, Regular Rhythm


GI/Abdominal Exam: Normal Bowel Sounds, Soft, Non-Tender, No Organomegaly, No 

Distention, No Abnormal Bruit, No Mass, Pelvis Stable


Back Exam: Normal Inspection, Full Range of Motion


Extremities: Normal Inspection, Normal Range of Motion, Non-Tender, Pedal Edema 

(+1 edema)


Neurological: Strength Equal Bilateral, Sensation Intact, Cranial Nerves 

Intact.  No: Normal Speech (garbled speech continues. Patient reports this is 

better)


Psy/Mental Status: Alert, Normal Affect, Normal Mood





- Problem List & Annotations


(1) Expressive aphasia


SNOMED Code(s): 386770798


   Code(s): R47.01 - APHASIA   Status: Acute   Current Visit: Yes   





(2) Cerebrovascular accident (CVA)


SNOMED Code(s): 524422739


   Code(s): I63.9 - CEREBRAL INFARCTION, UNSPECIFIED   Status: Suspected   

Current Visit: Yes   





(3) HTN (hypertension)


SNOMED Code(s): 60867923


   Code(s): I10 - ESSENTIAL (PRIMARY) HYPERTENSION   Status: Chronic   Current 

Visit: Yes   


Qualifiers: 


   Hypertension type: essential hypertension   Qualified Code(s): I10 - 

Essential (primary) hypertension   





(4) Hx of aneurysm


SNOMED Code(s): 006417708


   Code(s): Z86.79 - PERSONAL HISTORY OF OTHER DISEASES OF THE CIRCULATORY 

SYSTEM   Status: Chronic   Current Visit: Yes   





(5) Hypothyroidism


SNOMED Code(s): 52952522


   Code(s): E03.9 - HYPOTHYROIDISM, UNSPECIFIED   Status: Chronic   Current 

Visit: Yes   





(6) Adrenal insufficiency (Guille's disease)


SNOMED Code(s): 769910535


   Code(s): E27.1 - PRIMARY ADRENOCORTICAL INSUFFICIENCY   Status: Chronic   

Current Visit: No   





(7) History of nephrectomy


SNOMED Code(s): 71450231032675


   Code(s): Z90.5 - ACQUIRED ABSENCE OF KIDNEY   Status: Chronic   Current Visit

: Yes   





- Problem List Review


Problem List Initiated/Reviewed/Updated: Yes





- My Orders


Last 24 Hours: 


My Active Orders





04/25/18 08:17


Ang Head w wo Cont [MR] Urgent 


Ang Neck w wo Cont [MR] Urgent 


Brain w wo Cont [MR] Urgent 





04/25/18 08:18


Echo 2D wo Cont [US] Urgent 





04/25/18 08:21


GLYCOSYLATED HEMOGLOBIN,HGBA1C [CHEM] Routine 


LIPID PANEL [CHEM] Routine 














- Plan


Plan:: 


This 72 year old female admitted for slurred speech suspected TIA or possible 

CVA





1. Slurred speech: Continues to have some slurred speech, greater than 24 hours 

since symptoms onset. Head CT last night revealed No evidence of an acute 

intracranial hemorrhage, mass effect or loss of gray-white differentiation. 

Atrophy, chronic ischemic and post surgical changes. 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 Post right frontotemporal craniotomy changes and 

aneurysm clips in the region of the right MCA. Will order MRI brain and MRA 

head and neck today. ECHO to be obtained as well as lipid panel, , total 

cholesterol 211 HDL 51 and triglycerides 174  and A1c 6.1. Will encourage diet 

change with elevate A1c. Telemetry showing SR 60-80s no arrhythmia. Allow 

permissive HTN for now. ASA ordered, but patient is refusing this. Will speak 

with patient. Will also set up outpatient follow up with Neurology. Patient 

reports she has not seen neurology for awhile.





2. COPD exacerbation: No wheezing today, will decrease Solumderol to Q12hr and 

monitor.  Continue Home inhalers and Duonebs for now. Will monitor. Wean oxygen 

as possible to RA. Has significant COPD, and currently undergoing testing with 

PCP, Dr Francois for oxygen at night due to hypoxia noted with sleeping. No 

hypoxia noted at last few clinic visits, per Dr Francois. She has been sating now 

90s on RA. 





3. Lindsay's: Stable. Continue home Hydrocortisone dosing. Monitor. 





4. HTN: Stable. Allowing permissive HTN for now. Will monitor. Hold 

medications. 





VTE: SCDs for now, will obtain MRI, consider starting Heparin after MRI.





Dispo: 2-3 days pending improvement. 








Spoke with PCP, Dr Francois, aware of patient's admission and follow up treatment 

recommended with Neurology.

## 2018-04-26 VITALS — DIASTOLIC BLOOD PRESSURE: 106 MMHG | SYSTOLIC BLOOD PRESSURE: 135 MMHG

## 2018-04-26 LAB
CHLORIDE SERPL-SCNC: 103 MMOL/L (ref 98–107)
SODIUM SERPL-SCNC: 138 MMOL/L (ref 136–145)

## 2018-04-26 RX ADMIN — FLUTICASONE PROPIONATE AND SALMETEROL SCH PUFF: 50; 250 POWDER RESPIRATORY (INHALATION) at 09:58

## 2018-04-26 RX ADMIN — METHYLPREDNISOLONE SODIUM SUCCINATE SCH MG: 40 INJECTION, POWDER, FOR SOLUTION INTRAMUSCULAR; INTRAVENOUS at 09:55

## 2018-04-26 NOTE — MR
EXAM DATE: 18



PATIENT'S AGE: 72



Patient: KRISHNA TORRE



Facility: [place], [place]

Patient ID: [id number]

Site Patient ID: [id number].

Site Accession #: [id number].

: 1945

Study: MRI Neck Angio Angio [id number]/25/2018 6:26:48 PM

Ordering Physician: Aurelio Shin



Final Report: 

INDICATION:

Aphasia.



TECHNIQUE:

MRI brain: Multiplanar multisequence MR images were obtained of the brain 
without administration of intravenous contrast. 

MRA head: 3D time-of-flight images were obtained through the [place]. 

MRA neck: 2D and 3D time-of-flight MRA images were obtained through the neck. 



COMPARISON:

CT brain 2018, MRA head 2014.



FINDINGS:

MRI brain:

Postsurgical changes secondary to remote right temporal craniotomy for clipping 
of an aneurysm in the region of the right middle cerebral artery bifurcation. 
Susceptibility artifact secondary to the aneurysm clip limits evaluation of 
adjacent structures, particularly on the diffusion-weighted sequence. Within 
this limitation, no areas of diffusion restriction are identified to suggest 
acute infarction. Mild confluent T2 FLAIR hyperintensity in the right 
subinsular region and right frontal operculum may represent postsurgical 
sequelae. Suggested dilatation of the right middle cerebral artery bifurcation 
flow-void on T2 images. 

There is prominence of the ventricles and sulci compatible with mild diffuse 
cerebral volume loss. Scattered and patchy foci of T2 FLAIR hyperintensity 
within the supratentorial white matter and josiah are nonspecific, though favored 
to represent sequelae of mild-to-moderate chronic microvascular ischemic 
disease. Superimposed chronic lacunar infarctions within the right frontal 
centrum semiovale and bilateral basal ganglia. Mild left-sided wallerian 
degeneration.

Small foci of susceptibility in the left thalamus and posterior limb of left 
internal capsule may represent small chronic microhemorrhages.

The globes are symmetric in size. Small retention cyst or polyp in and mild 
mucosal thickening in the left maxillary sinus. The left sphenoid sinus is 
opacified. Trace fluid in left mastoid air cells.

MRA head:

Susceptibility artifact secondary to an aneurysm clip in the region of the 
right middle cerebral artery bifurcation limits evaluation for residual or 
recurrent aneurysm.

The major vessels of the anterior circulation, including the internal carotid, 
middle cerebral, anterior cerebral arteries are patent without hemodynamically 
significant stenosis within the limitations of susceptibility artifact. 
Anterior communicating artery is visualized.

The major vessels of the posterior circulation including the vertebral, basilar
, superior cerebellar, and posterior cerebral arteries are patent without 
hemodynamically significant stenosis. Fusiform aneurysmal dilatation of the 
basilar tip measuring 12 x 9 mm (TV oblique/AP oblique) has increased in size, 
previously 10 x 7 mm using similar measurement technique.

The vertebral arteries are codominant. The origin of the right posterior 
inferior cerebellar artery is identified.

MRA neck:

Noncontrast technique limits evaluation. 

There is a 3 vessel aortic arch. Tortuosity of the proximal right common 
carotid artery, as well as the V2 segments of the vertebral arteries.

No hemodynamically significant stenosis of the great vessels arising from the 
aortic arch, or the common carotid, internal carotid, external carotid, or 
vertebral arteries.



IMPRESSION:

1. No acute infarction within the limitations of susceptibility artifact.

2. 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 
slightly increased in size compared to the MRA dated 2014.

3. Postsurgical changes secondary to prior aneurysm clipping in the region of 
the right middle cerebral artery bifurcation. Artifact limits evaluation for 
residual/recurrent aneurysm, though there is suggested dilatation of the right 
middle cerebral artery bifurcation flow void. CTA could be obtained for further 
evaluation.

4. No hemodynamically significant stenosis of the major intracranial or 
cervical arteries.

5. Mild-to-moderate chronic microvascular ischemic disease with superimposed 
chronic lacunar infarctions in the bilateral basal ganglia and right frontal 
centrum semiovale.

6. Small foci of susceptibility in the left thalamus and left internal capsule 
may represent small chronic microhemorrhages and can be seen in the setting of 
hypertension.

7. Opacification of the left sphenoid sinus.





Dictated by Donny Crawford MD @ 2018 6:58PM

(Electronic Signature)





Report Signed by Proxy.
NIRAV

## 2018-04-26 NOTE — MR
EXAM DATE: 18



PATIENT'S AGE: 72



Patient: KRISHNA TORRE



Facility: [place], [place]

Patient ID: [id number]

Site Patient ID: [id number].

Site Accession #: [id number].

: 1945

Study: MRI Head [id number]/25/2018 6:23:53 PM

Ordering Physician: Aurelio Shin



Final Report: 

INDICATION:

Aphasia.



TECHNIQUE:

MRI brain: Multiplanar multisequence MR images were obtained of the brain 
without administration of intravenous contrast. 

MRA head: 3D time-of-flight images were obtained through the [place]. 

MRA neck: 2D and 3D time-of-flight MRA images were obtained through the neck. 



COMPARISON:

CT brain 2018, MRA head 2014.



FINDINGS:

MRI brain:

Postsurgical changes secondary to remote right temporal craniotomy for clipping 
of an aneurysm in the region of the right middle cerebral artery bifurcation. 
Susceptibility artifact secondary to the aneurysm clip limits evaluation of 
adjacent structures, particularly on the diffusion-weighted sequence. Within 
this limitation, no areas of diffusion restriction are identified to suggest 
acute infarction. Mild confluent T2 FLAIR hyperintensity in the right 
subinsular region and right frontal operculum may represent postsurgical 
sequelae. Suggested dilatation of the right middle cerebral artery bifurcation 
flow-void on T2 images. 

There is prominence of the ventricles and sulci compatible with mild diffuse 
cerebral volume loss. Scattered and patchy foci of T2 FLAIR hyperintensity 
within the supratentorial white matter and josiah are nonspecific, though favored 
to represent sequelae of mild-to-moderate chronic microvascular ischemic 
disease. Superimposed chronic lacunar infarctions within the right frontal 
centrum semiovale and bilateral basal ganglia. Mild left-sided wallerian 
degeneration.

Small foci of susceptibility in the left thalamus and posterior limb of left 
internal capsule may represent small chronic microhemorrhages.

The globes are symmetric in size. Small retention cyst or polyp in and mild 
mucosal thickening in the left maxillary sinus. The left sphenoid sinus is 
opacified. Trace fluid in left mastoid air cells.

MRA head:

Susceptibility artifact secondary to an aneurysm clip in the region of the 
right middle cerebral artery bifurcation limits evaluation for residual or 
recurrent aneurysm.

The major vessels of the anterior circulation, including the internal carotid, 
middle cerebral, anterior cerebral arteries are patent without hemodynamically 
significant stenosis within the limitations of susceptibility artifact. 
Anterior communicating artery is visualized.

The major vessels of the posterior circulation including the vertebral, basilar
, superior cerebellar, and posterior cerebral arteries are patent without 
hemodynamically significant stenosis. Fusiform aneurysmal dilatation of the 
basilar tip measuring 12 x 9 mm (TV oblique/AP oblique) has increased in size, 
previously 10 x 7 mm using similar measurement technique.

The vertebral arteries are codominant. The origin of the right posterior 
inferior cerebellar artery is identified.

MRA neck:

Noncontrast technique limits evaluation. 

There is a 3 vessel aortic arch. Tortuosity of the proximal right common 
carotid artery, as well as the V2 segments of the vertebral arteries.

No hemodynamically significant stenosis of the great vessels arising from the 
aortic arch, or the common carotid, internal carotid, external carotid, or 
vertebral arteries.



IMPRESSION:

1. No acute infarction within the limitations of susceptibility artifact.

2. 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 
slightly increased in size compared to the MRA dated 2014.

3. Postsurgical changes secondary to prior aneurysm clipping in the region of 
the right middle cerebral artery bifurcation. Artifact limits evaluation for 
residual/recurrent aneurysm, though there is suggested dilatation of the right 
middle cerebral artery bifurcation flow void. CTA could be obtained for further 
evaluation.

4. No hemodynamically significant stenosis of the major intracranial or 
cervical arteries.

5. Mild-to-moderate chronic microvascular ischemic disease with superimposed 
chronic lacunar infarctions in the bilateral basal ganglia and right frontal 
centrum semiovale.

6. Small foci of susceptibility in the left thalamus and left internal capsule 
may represent small chronic microhemorrhages and can be seen in the setting of 
hypertension.

7. Opacification of the left sphenoid sinus.





Dictated by Donny Crawford MD @ 2018 6:31PM

(Electronic Signature)





Report Signed by Proxy.
NIRAV

## 2018-04-26 NOTE — MR
EXAM DATE: 18



PATIENT'S AGE: 72



Patient: KRISHNA TORRE



Facility: [place] [place]

Patient ID: [id number]

Site Patient ID: [id number].

Site Accession #: [id number].

: 1945

Study: MRI Head Angio [id number]/2018 6:22:59 PM

Ordering Physician: Aurelio Shin



Final Report: 

INDICATION:

Aphasia.



TECHNIQUE:

MRI brain: Multiplanar multisequence MR images were obtained of the brain 
without administration of intravenous contrast. 

MRA head: 3D time-of-flight images were obtained through the [place] [employer] [place]. 

MRA neck: 2D and 3D time-of-flight MRA images were obtained through the neck. 



COMPARISON:

CT brain 2018, MRA head 2014.



FINDINGS:

MRI brain:

Postsurgical changes secondary to remote right temporal craniotomy for clipping 
of an aneurysm in the region of the right middle cerebral artery bifurcation. 
Susceptibility artifact secondary to the aneurysm clip limits evaluation of 
adjacent structures, particularly on the diffusion-weighted sequence. Within 
this limitation, no areas of diffusion restriction are identified to suggest 
acute infarction. Mild confluent T2 FLAIR hyperintensity in the right 
subinsular region and right frontal operculum may represent postsurgical 
sequelae. Suggested dilatation of the right middle cerebral artery bifurcation 
flow-void on T2 images. 

There is prominence of the ventricles and sulci compatible with mild diffuse 
cerebral volume loss. Scattered and patchy foci of T2 FLAIR hyperintensity 
within the supratentorial white matter and josiah are nonspecific, though favored 
to represent sequelae of mild-to-moderate chronic microvascular ischemic 
disease. Superimposed chronic lacunar infarctions within the right frontal 
centrum semiovale and bilateral basal ganglia. Mild left-sided wallerian 
degeneration.

Small foci of susceptibility in the left thalamus and posterior limb of left 
internal capsule may represent small chronic microhemorrhages.

The globes are symmetric in size. Small retention cyst or polyp in and mild 
mucosal thickening in the left maxillary sinus. The left sphenoid sinus is 
opacified. Trace fluid in left mastoid air cells.

MRA head:

Susceptibility artifact secondary to an aneurysm clip in the region of the 
right middle cerebral artery bifurcation limits evaluation for residual or 
recurrent aneurysm.

The major vessels of the anterior circulation, including the internal carotid, 
middle cerebral, anterior cerebral arteries are patent without hemodynamically 
significant stenosis within the limitations of susceptibility artifact. 
Anterior communicating artery is visualized.

The major vessels of the posterior circulation including the vertebral, basilar
, superior cerebellar, and posterior cerebral arteries are patent without 
hemodynamically significant stenosis. Fusiform aneurysmal dilatation of the 
basilar tip measuring 12 x 9 mm (TV oblique/AP oblique) has increased in size, 
previously 10 x 7 mm using similar measurement technique.

The vertebral arteries are codominant. The origin of the right posterior 
inferior cerebellar artery is identified.

MRA neck:

Noncontrast technique limits evaluation. 

There is a 3 vessel aortic arch. Tortuosity of the proximal right common 
carotid artery, as well as the V2 segments of the vertebral arteries.

No hemodynamically significant stenosis of the great vessels arising from the 
aortic arch, or the common carotid, internal carotid, external carotid, or 
vertebral arteries.



IMPRESSION:

1. No acute infarction within the limitations of susceptibility artifact.

2. 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 
slightly increased in size compared to the MRA dated 2014.

3. Postsurgical changes secondary to prior aneurysm clipping in the region of 
the right middle cerebral artery bifurcation. Artifact limits evaluation for 
residual/recurrent aneurysm, though there is suggested dilatation of the right 
middle cerebral artery bifurcation flow void. CTA could be obtained for further 
evaluation.

4. No hemodynamically significant stenosis of the major intracranial or 
cervical arteries.

5. Mild-to-moderate chronic microvascular ischemic disease with superimposed 
chronic lacunar infarctions in the bilateral basal ganglia and right frontal 
centrum semiovale.

6. Small foci of susceptibility in the left thalamus and left internal capsule 
may represent small chronic microhemorrhages and can be seen in the setting of 
hypertension.

7. Opacification of the left sphenoid sinus.





Dictated by Donny Crawford MD @ 2018 6:57PM

(Electronic Signature)





Report Signed by Proxy.
NIRAV

## 2018-04-26 NOTE — PCM.DCSUM1
**Discharge Summary





- Hospital Course


Brief History: This 72-year-old female with a pmh of HTN, hypothyroidism, UTIs, 

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 

complaints of speech slurring and speech changes that started about 2:58 AM 4/24 /18. She reports that it started and she feels like it is getting worse which 

is why she came in. She is not having trouble swallowing and she is not having 

any arm or leg weakness. She has not had any falls with this. She has no chest 

pain no abdominal pain no nausea or vomiting but does complain of a frontal 

headache. Patient came in via triage with a family member. The patient tells 

triage that she has a history of strokes in the past but per the computer she 

has a history of the intracranial aneurysm in the last testing that I see is an 

MRI/MRA of the head done August 14, 2014 revealing the 10 x 7 mm fusiform 

aneurysmal dilatation of the basilar tip and aneurysmal clip within the right 

MCA distribution. According to the patient she had the one aneurysm clipped but 

the other one they were not able to do anything about. She does not have any 

neck pain currently. As for the oxygen she was discharged home with, she says 

that she has not had that for 2 weeks so she has not been using it. The patient 

also tells nursing that her left arm and her left leg seemed a little bit 

tingly but they are not weak. The patient again reiterates that all the 

symptoms started at 2:58 AM this morning. Upon asking further by nursing the 

patient says that she could not afford to get the oxygen at home which is why 

she is not using on a regular basis.  In the ED WBC 9.79, BUN 42 Cr 2.2, which 

is baseline. Otherwise BMP WNL, troponin negative. TSH 0.29. CXR negative. EKG 

SR rate in the 60s. Head CT revealed no evidence of an acute intracranial 

hemorrhage, mass effect or loss of gray white differention. Atrophy, chronic 

ischemic and post surgical changes noted. A 1.1 centimeter basilar tip aneurysm 

again noted. She was admitted for TIA rule out CVA workup.  PCP, Dr Francois.





- Discharge Data


Discharge Date: 04/26/18


Discharge Disposition: Home, Self-Care 01


Condition: Good





- Discharge Diagnosis/Problem(s)


(1) TIA (transient ischemic attack)


SNOMED Code(s): 547614512


   ICD Code: G45.9 - TRANSIENT CEREBRAL ISCHEMIC ATTACK, UNSPECIFIED   Status: 

Acute   Current Visit: Yes   





(2) HTN (hypertension)


SNOMED Code(s): 67335773


   ICD Code: I10 - ESSENTIAL (PRIMARY) HYPERTENSION   Status: Chronic   Current 

Visit: Yes   


Qualifiers: 


   Hypertension type: essential hypertension   Qualified Code(s): I10 - 

Essential (primary) hypertension   





(3) Hx of aneurysm


SNOMED Code(s): 685342498


   ICD Code: Z86.79 - PERSONAL HISTORY OF OTHER DISEASES OF THE CIRCULATORY 

SYSTEM   Status: Chronic   Current Visit: Yes   





(4) Hypothyroidism


SNOMED Code(s): 41927239


   ICD Code: E03.9 - HYPOTHYROIDISM, UNSPECIFIED   Status: Chronic   Current 

Visit: Yes   





(5) Adrenal insufficiency (Norton's disease)


SNOMED Code(s): 947263119


   ICD Code: E27.1 - PRIMARY ADRENOCORTICAL INSUFFICIENCY   Status: Chronic   

Current Visit: No   





(6) History of nephrectomy


SNOMED Code(s): 65701553701365


   ICD Code: Z90.5 - ACQUIRED ABSENCE OF KIDNEY   Status: Chronic   Current 

Visit: Yes   





- Patient Summary/Data


Consults: 


 Consultations





04/24/18 22:02


PT Evaluation and Treatment [CONS] Routine 














- Patient Instructions


Diet: Heart Healthy Diet


Activity: As Tolerated, No Strenuous Activities, Rest and Relax Today


Driving: Do Not Drive


Showering/Bathing: May Shower


Notify Provider of: Fever, Increased Pain, Swelling and Redness, Drainage, 

Nausea and/or Vomiting





- Discharge Plan


Prescriptions/Med Rec: 


Aspirin 81 mg PO DAILY #30 tab.chew


Home Medications: 


 Home Meds





Omeprazole 40 mg PO DAILY 07/28/14 [History]


Hydrocortisone 10 mg PO 1600 02/08/15 [History]


Hydrocortisone 15 mg PO 0700 02/08/15 [History]


Propranolol [Inderal] 40 mg PO BEDTIME 09/30/16 [History]


Calcium Carbonate [Calcium] 500 mg PO ASDIRECTED 07/06/17 [History]


Vitamin B Complex 1 each PO DAILY 07/06/17 [History]


Levothyroxine [Synthroid] 75 mcg PO 0500 04/02/18 [History]


Albuterol [IJD: Albuterol HFA] 1 puff .XX Q4H PRN 30 Days #8 gm 04/03/18 [Rx]


Fluticasone/Salmeterol [Advair 250-50] 1 puff INH BID 30 Days #1 diskus 04/03/ 18 [Rx]


Aspirin 81 mg PO DAILY #30 tab.chew 04/26/18 [Rx]








Referrals: 


Gisela Feliciano MD [Physician] - 05/31/18 2:15 pm


Eliot Francois MD [Primary Care Provider] - 





- Discharge Summary/Plan Comment


DC Time >30 min.: No


Discharge Summary/Plan Comment: 





Discharge Diagnoses: 





TIA


HTN


Hx brain aneurysm clipping 


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


COPD


Hyperlipidemia


Adrenal insufficiency


hx nephrectomy


Hx TIA








Lolly was admitted and worked up for CVA. Slurred and garbled speech improved 

within 24 hours. ECHO is pending upon discharge. Telemetry revealed only SR, no 

arrhythmias. MRI brain and MRA head and neck obtained which revealed, no acute 

infarction within the limitations of susceptibility artifact, Fusiform 

aneurysmal dilatation of the basilar tip measuring 12 x 9 mm has slightly 

increased in size compared to the MRA dated 08/14/2014, Postsurgical changes 

secondary to prior aneurysm clipping in the region of the right middle cerebral 

artery bifurcation. Artifact limits evaluation for residual/recurrent aneurysm, 

though there is suggested dilatation of the right middle cerebral artery 

bifurcation flow void. CTA could be obtained for further evaluation, No 

hemodynamically significant stenosis of the major intracranial or cervical 

arteries, Mild-to-moderate chronic microvascular ischemic disease with 

superimposed chronic lacunar infarctions in the bilateral basal ganglia and 

right frontal centrum semiovale, Small foci of susceptibility in the left 

thalamus and left internal capsule may represent small chronic microhemorrhages 

and can be seen in the setting of hypertension, Opacification of the left 

sphenoid sinus. I discussed the slight increase in size of dilatation of 

basilar tip with Dr Dooley, neurosurgeon, At [facility], 

he recommended nothing acutely or emergently needs to occur, but she will need 

to see a Endovascular neurosurgeon in the near future. He reports the closest 

he is aware of is [facility] in [place], [place]. He recommended good BP control, 

cholesterol control and ASA. Cholesterol is elevated Triglycerides 174, Total 

211, , HDL 51. She was started on Atorvastatin 80 mg daily. She was 

highly encouraged to take ASA daily, which she has intermittently refused here 

and daughter has reported she was told to never take that by PCP. I spoke with 

Dr Francois, PCP who agrees she SHOULD be taking ASA. I spoke with Lolly today 

and highly encouraged at least an 81 mg baby aspirin daily, which she agreed 

she could take and "I have a full bottle at home". She was also encouraged to 

watch her diet due to elevated cholesterol as well as A1c at 6.1, which is 

prediabetic. She reports gaining a lot of weight over the winter and has 

started watching her diet. She was encouraged to limited carbs and sweets and 

eat more vegetables and lean meats and fish. 





She was given increase dose of steroids during her stay due to some increased 

wheezing. She is noted to be better today. She has slight leukocytosis, 12,000, 

but likely secondary to this increase in steroids. No signs of infection. She 

is to continue all home medications for COPD and continue with follow up with 

PCP for oxygen at night time. Lolly is very eager to be discharged home today. 

She will have follow up with PCP, Dr Francois, Dr Feliciano and endovascular 

neurosurgeon in [place]. She was notified of this follow up being recommended by 

Dr Dooley. She reports she will speak with her daughter and work on getting to 

this appointment.  She is to return to ED or clinic if concerns should arise. 





- General Info


Date of Service: 04/26/18


Admission Dx/Problem (Free Text: 


 Admission Diagnosis/Problem





Admission Diagnosis/Problem      slurred speech








Subjective Update: 





Feeling much better today and asking for discharge home. No concerns. Slurred 

speech is gone. No chest pain or SOB. No headaches or focal neurologic 

deficits. 


Functional Status: Reports: Pain Controlled, Tolerating Diet, Ambulating, 

Urinating





- Review of Systems


General: Reports: No Symptoms.  Denies: Fever, Weakness, Fatigue, Malaise


HEENT: Reports: No Symptoms.  Denies: Headaches, Sore Throat


Pulmonary: Reports: No Symptoms.  Denies: Shortness of Breath, Cough, Sputum, 

Hemoptysis


Cardiovascular: Reports: No Symptoms.  Denies: Chest Pain, Edema


Gastrointestinal: Reports: No Symptoms.  Denies: Abdominal Pain, Nausea, 

Vomiting


Genitourinary: Reports: No Symptoms


Musculoskeletal: Reports: No Symptoms


Skin: Reports: No Symptoms


Neurological: Reports: No Symptoms


Psychiatric: Reports: No Symptoms





- Patient Data


Vitals - Most Recent: 


 Last Vital Signs











Temp  98.8 F   04/26/18 08:00


 


Pulse  98   04/26/18 08:00


 


Resp  20   04/26/18 08:00


 


BP  135/106 H  04/26/18 08:00


 


Pulse Ox  94 L  04/26/18 08:00











Weight - Most Recent: 109.5 kg


I&O - Last 24 hours: 


 Intake & Output











 04/25/18 04/26/18 04/26/18





 22:59 06:59 14:59


 


Intake Total 520 436 


 


Output Total 800 1050 


 


Balance -280 -614 











Lab Results - Last 24 hrs: 


 Laboratory Results - last 24 hr











  04/26/18 04/26/18 Range/Units





  05:50 05:50 


 


WBC  12.62 H   (4.0-11.0)  K/uL


 


RBC  3.31 L   (4.30-5.90)  M/uL


 


Hgb  9.9 L   (12.0-16.0)  g/dL


 


Hct  31.5 L   (36.0-46.0)  %


 


MCV  95.2   (80.0-98.0)  fL


 


MCH  29.9   (27.0-32.0)  pg


 


MCHC  31.4   (31.0-37.0)  g/dL


 


RDW Std Deviation  56.8   (28.0-62.0)  fl


 


RDW Coeff of Saray  16 H   (11.0-15.0)  %


 


Plt Count  291   (150-400)  K/uL


 


MPV  9.60   (7.40-12.00)  fL


 


Neut % (Auto)  91.3 H   (48.0-80.0)  %


 


Lymph % (Auto)  5.5 L   (16.0-40.0)  %


 


Mono % (Auto)  3.1   (0.0-15.0)  %


 


Eos % (Auto)  0.0   (0.0-7.0)  %


 


Baso % (Auto)  0.1   (0.0-1.5)  %


 


Neut # (Auto)  11.5 H   (1.4-5.7)  K/uL


 


Lymph # (Auto)  0.7   (0.6-2.4)  K/uL


 


Mono # (Auto)  0.4   (0.0-0.8)  K/uL


 


Eos # (Auto)  0.0   (0.0-0.7)  K/uL


 


Baso # (Auto)  0.0   (0.0-0.1)  K/uL


 


Nucleated RBC %  0.0   /100WBC


 


Nucleated RBCs #  0   K/uL


 


Sodium   138  (136-145)  mmol/L


 


Potassium   4.8  (3.5-5.1)  mmol/L


 


Chloride   103  ()  mmol/L


 


Carbon Dioxide   32.5 H  (21.0-32.0)  mmol/L


 


BUN   37 H  (7.0-18.0)  mg/dL


 


Creatinine   1.9 H  (0.6-1.0)  mg/dL


 


Est Cr Clr Drug Dosing   21.49  mL/min


 


Estimated GFR (MDRD)   25.8  ml/min


 


Glucose   169 H  ()  mg/dL


 


Calcium   8.5  (8.5-10.1)  mg/dL


 


Magnesium   1.6  (1.5-2.0)  mg/dL











Med Orders - Current: 


 Current Medications





Albuterol/Ipratropium (Duoneb 3.0-0.5 Mg/3 Ml)  3 ml NEB Q4HRRT PRN


   PRN Reason: Shortness Of Breath/wheezing


   Last Admin: 04/26/18 01:19 Dose:  3 ml


Aspirin (Aspirin)  300 mg RECTAL DAILY [facility]


   Last Admin: 04/25/18 09:32 Dose:  Not Given


Atorvastatin Calcium (Lipitor)  80 mg PO BEDTIME [facility]


   Last Admin: 04/25/18 21:25 Dose:  80 mg


Calcium Carbonate/Glycine (Oyster Shell Calcium)  1 mg PO ASDIRECTED [facility]


Hydrocortisone (Cortef)  10 mg PO 1600 [facility]


   Last Admin: 04/25/18 16:10 Dose:  10 mg


Hydrocortisone (Cortef)  15 mg PO 0700 [facility]


   Last Admin: 04/26/18 06:00 Dose:  15 mg


Levothyroxine Sodium (Levothyroxine)  75 mcg PO 0500 [facility]


   Last Admin: 04/26/18 05:56 Dose:  75 mcg


Methylprednisolone Sodium Succinate (Solu-Medrol)  40 mg IVPUSH Q12H [facility]


   Last Admin: 04/25/18 21:28 Dose:  40 mg


Omeprazole (Omeprazole)  40 mg PO DAILY [facility]


Vitamin B Complex 1 (Each)  1 each PO DAILY [facility]


Fluticasone/Salmeterol (Advair Diskus 250-50)  1 puff INH BID [facility]


   Last Admin: 04/25/18 20:14 Dose:  1 puff


Sodium Chloride (Saline Flush)  10 ml FLUSH ASDIRECTED PRN


   PRN Reason: Keep Vein Open


Sodium Chloride (Saline Flush)  2.5 ml FLUSH ASDIRECTED PRN


   PRN Reason: Keep Vein Open


Sodium Chloride (Saline Flush)  10 ml FLUSH ASDIRECTED PRN


   PRN Reason: Keep Vein Open


Sodium Chloride (Saline Flush)  2.5 ml FLUSH ASDIRECTED PRN


   PRN Reason: Keep Vein Open





Discontinued Medications





Aspirin (Aspirin)  325 mg PO ONETIME ONE


   Stop: 04/24/18 18:13


   Last Admin: 04/24/18 18:41 Dose:  Not Given


Atorvastatin Calcium (Lipitor)  40 mg PO BEDTIME [facility]


Atorvastatin Calcium (Lipitor)  40 mg PO BEDTIME [facility]


   Last Admin: 04/24/18 22:53 Dose:  40 mg


Hydrocortisone (Cortef)  10 mg PO ONETIME ONE


   Stop: 04/24/18 22:32


   Last Admin: 04/24/18 23:46 Dose:  10 mg


Lactated Ringer's (Ringers, Lactated)  1,000 mls @ 125 mls/hr IV ASDIRECTED [facility]


   Last Admin: 04/24/18 22:55 Dose:  125 mls/hr


Lactated Ringer's (Ringers, Lactated)  1,000 mls @ 100 mls/hr IV ASDIRECTED [facility]


Methylprednisolone Sodium Succinate (Solu-Medrol)  40 mg IVPUSH Q6H [facility]


   Last Admin: 04/25/18 11:15 Dose:  Not Given











- Exam


General: Reports: Alert, Oriented, Cooperative, No Acute Distress


Neck: Reports: Supple


Lungs: Reports: Normal Respiratory Effort, Wheezing (scant upper lobe exp 

wheezing)


Cardiovascular: Reports: Regular Rate, Regular Rhythm


GI/Abdominal Exam: Normal Bowel Sounds, Soft, Non-Tender, No Organomegaly, No 

Distention, No Abnormal Bruit, No Mass, Pelvis Stable


Back Exam: Reports: Normal Inspection, Full Range of Motion


Extremities: Normal Inspection, Normal Range of Motion, Non-Tender, No Pedal 

Edema, Normal Capillary Refill


Neurological: Reports: No New Focal Deficit, Normal Gait, Normal Speech


Psy/Mental Status: Reports: Alert, Normal Affect, Normal Mood

## 2018-04-27 NOTE — ECHO
The echocardiogram report can be seen in this patient's EMR (electronic medical 
record) in the Reports section. The echocardiogram report has also been scanned 
into PACS and can be seen there.  
NIRAV

## 2018-06-26 ENCOUNTER — HOSPITAL ENCOUNTER (EMERGENCY)
Dept: HOSPITAL 56 - MW.ED | Age: 73
Discharge: HOME | End: 2018-06-26
Payer: MEDICARE

## 2018-06-26 VITALS — SYSTOLIC BLOOD PRESSURE: 149 MMHG | DIASTOLIC BLOOD PRESSURE: 70 MMHG

## 2018-06-26 DIAGNOSIS — Z79.899: ICD-10-CM

## 2018-06-26 DIAGNOSIS — Z79.82: ICD-10-CM

## 2018-06-26 DIAGNOSIS — Z91.012: ICD-10-CM

## 2018-06-26 DIAGNOSIS — Z88.5: ICD-10-CM

## 2018-06-26 DIAGNOSIS — I10: ICD-10-CM

## 2018-06-26 DIAGNOSIS — Z86.73: ICD-10-CM
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
## 2018-06-26 DIAGNOSIS — S91.202A: Primary | ICD-10-CM

## 2018-06-26 DIAGNOSIS — Z88.0: ICD-10-CM

## 2018-06-26 NOTE — CR
EXAMINATION: Left knee

 

HISTORY: Pain

 

COMPARISON: 3/14/2017

 

TECHNIQUE: PA view

 

FINDINGS/IMPRESSION: There is moderate joint space narrowing within the medial compartment. Osteophy
te formation is noted. Bone mineralization appears mildly osteopenic without an acute osseous abnorm
ality. None known

## 2018-06-26 NOTE — EDM.PDOC
ED HPI GENERAL MEDICAL PROBLEM





- General


Chief Complaint: Lower Extremity Injury/Pain


Stated Complaint: LEFT BIG TOE NAIL CUT


Time Seen by Provider: 06/26/18 18:14


Source of Information: Reports: Patient


History Limitations: Reports: No Limitations





- History of Present Illness


INITIAL COMMENTS - FREE TEXT/NARRATIVE: 


History of present illness:


[]Patient shortly ripped off her toenail  after it got caught in her opposite 

pant leg.





Review of systems: 


As per history of present illness and below otherwise all systems reviewed and 

negative.





Past medical history: 


As per history of present illness and as reviewed below otherwise 

noncontributory.





Surgical history: 


As per history of present illness and as reviewed below otherwise 

noncontributory.





Social history: 


No reported history of drug or alcohol abuse.





Family history: 


As per history of present illness and as reviewed below otherwise 

noncontributory.





Physical exam:


General: Well developed, well nourished in NAD


HEENT: Atraumatic, normocephalic, pupils reactive, negative for conjunctival 

pallor or scleral icterus, mucous membranes moist, throat clear, neck supple, 

nontender, trachea midline.


Lungs: Clear to auscultation, breath sounds equal bilaterally, chest nontender.


Heart: S1S2, regular, negative for clicks, rubs, or JVD.


Abdomen: Soft, nondistended, nontender. Negative for masses or 

hepatosplenomegaly. Negative for costovertebral tenderness.


Pelvis: Stable nontender.


Genitourinary: Deferred.


Rectal: Deferred.


Extremities: Left great toe with partially avulsed toenail attached distal 

lateral corner, toenail is grossly thickened and completely infected with 

fungus. negative for cords or calf pain. Neurovascular unremarkable.


Neuro: Awake, alert, oriented. Cranial nerves II through XII unremarkable. 

Cerebellum unremarkable. Motor and sensory unremarkable throughout. Exam 

nonfocal.





Diagnostics:


[]





Therapeutics:


[]Toenail removed completely and tolerated well





Impression: 


[]Toenail avulsion





Plan:


[]Tylenol or Motrin for pain. Bacitracin to toenail bed follow-up with podiatry 

as needed





Definitive disposition and diagnosis as appropriate pending reevaluation and 

review of above.








- Related Data


 Allergies











Allergy/AdvReac Type Severity Reaction Status Date / Time


 


egg Allergy Mild Nausea Verified 06/26/18 18:28


 


codeine Allergy  Rash Verified 06/26/18 18:28


 


diazepam [From Valium] Allergy  Rash Verified 06/26/18 18:28


 


morphine Allergy  Agitation Verified 06/26/18 18:28


 


Penicillins Allergy  Other Verified 06/26/18 18:28











Home Meds: 


 Home Meds





Omeprazole 40 mg PO DAILY 07/28/14 [History]


Hydrocortisone 10 mg PO 1600 02/08/15 [History]


Hydrocortisone 15 mg PO 0700 02/08/15 [History]


Propranolol [Inderal] 40 mg PO BEDTIME 09/30/16 [History]


Calcium Carbonate [Calcium] 500 mg PO ASDIRECTED 07/06/17 [History]


Vitamin B Complex 1 each PO DAILY 07/06/17 [History]


Levothyroxine [Synthroid] 75 mcg PO 0500 04/02/18 [History]


Albuterol [IJD: Albuterol HFA] 1 puff .XX Q4H PRN 30 Days #8 gm 04/03/18 [Rx]


Fluticasone/Salmeterol [Advair 250-50] 1 puff INH BID 30 Days #1 diskus 04/03/ 18 [Rx]


Aspirin 81 mg PO DAILY #30 tab.chew 04/26/18 [Rx]











Past Medical History


HEENT History: Reports: Impaired Vision


Cardiovascular History: Reports: Hypertension


Other Cardiovascular History: hypotension


Respiratory History: Reports: SOB


Gastrointestinal History: Reports: None


Genitourinary History: Reports: None


OB/GYN History: Reports: Pregnancy


Musculoskeletal History: Reports: None


Neurological History: Reports: Cerebral Aneurysms, CVA, Other (See Below)


Other Neuro History: brain tumor


Psychiatric History: Reports: None


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


Hematologic History: Reports: None


Immunologic History: Reports: None


Oncologic (Cancer) History: Reports: Other (See Below)


Other Oncologic History: brain tumors, treated with radiation


Dermatologic History: Reports: None





- Infectious Disease History


Infectious Disease History: Reports: Chicken Pox, Measles, Mumps


Other Infectious Disease History: childhood





- Past Surgical History


HEENT Surgical History: Reports: Other (See Below)


Other HEENT Surgeries/Procedures: benign brain tumors removed in 7611-1221


Female  Surgical History: Reports: Hysterectomy, Nephrectomy


Other Female  Surgeries/Procedures: only one kidney





Social & Family History





- Family History


Family Medical History: Noncontributory





- Caffeine Use


Caffeine Use: Reports: Coffee, Tea





Review of Systems





- Review of Systems


Review Of Systems: See Below (See history of present illness)





ED EXAM, GENERAL





- Physical Exam


Exam: See Below (See history of present illness)





Course





- Vital Signs


Last Recorded V/S: 


 Last Vital Signs











Temp  96.8 F   06/26/18 18:30


 


Pulse  80   06/26/18 18:30


 


Resp  16   06/26/18 18:30


 


BP  149/70 H  06/26/18 18:30


 


Pulse Ox  93 L  06/26/18 18:30














- Orders/Labs/Meds


Meds: 


Medications














Discontinued Medications














Generic Name Dose Route Start Last Admin





  Trade Name Sissy  PRN Reason Stop Dose Admin


 


Bupivacaine HCl  10 ml  06/26/18 18:24  





  Sensorcaine-Mpf 0.5%  INJECT  06/26/18 18:25  





  ONETIME ONE   





     





     





     





     


 


Lidocaine HCl  5 ml  06/26/18 18:24  





  Xylocaine-Mpf 1%  INJECT  06/26/18 18:25  





  ONETIME ONE   





     





     





     





     














Departure





- Departure


Time of Disposition: 18:36


Disposition: Home, Self-Care 01


Condition: Good


Clinical Impression: 


 Avulsion of toenail of left foot








- Discharge Information


Referrals: 


Eliot Francois MD [Primary Care Provider] - 


Forms:  ED Department Discharge


Additional Instructions: 


The following information is given to patients seen in the emergency department 

who are being discharged to home. This information is to outline your options 

for follow-up care. We provide all patients seen in our emergency department 

with a follow-up referral.





The need for follow-up, as well as the timing and circumstances, are variable 

depending upon the specifics of your emergency department visit.





If you don't have a primary care physician on staff, we will provide you with a 

referral. We always advise you to contact your personal physician following an 

emergency department visit to inform them of the circumstance of the visit and 

for follow-up with them and/or the need for any referrals to a consulting 

specialist.





The emergency department will also refer you to a specialist when appropriate. 

This referral assures that you have the opportunity for follow-up care with a 

specialist. All of these measure are taken in an effort to provide you with 

optimal care, which includes your follow-up.





Under all circumstances we always encourage you to contact your private 

physician who remains a resource for coordinating your care. When calling for 

follow-up care, please make the office aware that this follow-up is from your 

recent emergency room visit. If for any reason you are refused follow-up, 

please contact the [facility] Emergency 

Department at [phone] and asked to speak to the emergency department 

charge nurse.





Soap and water to clean wound use antibiotic ointment and keep it covered with 

Band-Aid.